# Patient Record
Sex: FEMALE | URBAN - METROPOLITAN AREA
[De-identification: names, ages, dates, MRNs, and addresses within clinical notes are randomized per-mention and may not be internally consistent; named-entity substitution may affect disease eponyms.]

---

## 2017-01-18 ENCOUNTER — MEDICAL CORRESPONDENCE (OUTPATIENT)
Dept: HEALTH INFORMATION MANAGEMENT | Facility: CLINIC | Age: 42
End: 2017-01-18

## 2017-01-18 ENCOUNTER — TRANSFERRED RECORDS (OUTPATIENT)
Dept: HEALTH INFORMATION MANAGEMENT | Facility: CLINIC | Age: 42
End: 2017-01-18

## 2017-01-24 ENCOUNTER — PRE VISIT (OUTPATIENT)
Dept: ORTHOPEDICS | Facility: CLINIC | Age: 42
End: 2017-01-24

## 2017-01-24 NOTE — TELEPHONE ENCOUNTER
1.  Date/reason for appt: 2/16/17- Left Hip Labral Tear     2.  Referring provider: Dr. Abundio Mclaughlin    3.  Call to patient (Yes / No - short description): No, referred by [facility] - faxed cover sheet

## 2017-01-25 NOTE — TELEPHONE ENCOUNTER
Records received from [facility], will forward to clinic. Waiting for imaging.  MRI left hip on 11/30/16    Missing: Injection at [facility]

## 2017-01-26 NOTE — TELEPHONE ENCOUNTER
MRI L Hip 11/30/16 radiology report received from , will forward to clinic and notify Sejal to cecilia.

## 2017-01-26 NOTE — TELEPHONE ENCOUNTER
Additional records received from [facility], will forward to clinic.  Xray left pelvis on 5/27/16    Missing injection.

## 2017-01-27 NOTE — TELEPHONE ENCOUNTER
Tried calling  Medical Records - no answer. Was on hold for over 7 minutes.     Faxed cover sheet for injection reports.

## 2017-02-02 ENCOUNTER — MEDICAL CORRESPONDENCE (OUTPATIENT)
Dept: HEALTH INFORMATION MANAGEMENT | Facility: CLINIC | Age: 42
End: 2017-02-02

## 2017-02-06 ASSESSMENT — ENCOUNTER SYMPTOMS
MYALGIAS: 1
JOINT SWELLING: 0
MUSCLE WEAKNESS: 0
MUSCLE CRAMPS: 1
ARTHRALGIAS: 1
BACK PAIN: 0
NECK PAIN: 0
STIFFNESS: 1

## 2017-02-06 ASSESSMENT — ACTIVITIES OF DAILY LIVING (ADL)
ADL_MEAN: 1.35
ADL_SUBSCALE_SCORE: 66.17
ADL_SUM: 23

## 2017-02-06 ASSESSMENT — HOOS S4: HOW SEVERE IS YOUR HIP JOINT STIFFNESS AFTER FIRST WAKENING IN THE MORNING?: MILD

## 2017-02-15 DIAGNOSIS — M24.152 DEGENERATIVE TEAR OF ACETABULAR LABRUM OF LEFT HIP: Primary | ICD-10-CM

## 2017-02-16 ENCOUNTER — OFFICE VISIT (OUTPATIENT)
Dept: ORTHOPEDICS | Facility: CLINIC | Age: 42
End: 2017-02-16

## 2017-02-16 VITALS — HEIGHT: 72 IN | WEIGHT: 173.8 LBS | BODY MASS INDEX: 23.54 KG/M2

## 2017-02-16 DIAGNOSIS — S73.192A TEAR OF ACETABULAR LABRUM, LEFT, INITIAL ENCOUNTER: Primary | ICD-10-CM

## 2017-02-16 PROBLEM — N94.9 DISORDER OF PELVIS: Status: ACTIVE | Noted: 2017-01-20

## 2017-02-16 PROBLEM — S73.199A TEAR OF ACETABULAR LABRUM: Status: ACTIVE | Noted: 2017-01-20

## 2017-02-16 PROBLEM — M25.859 FEMOROACETABULAR IMPINGEMENT: Status: ACTIVE | Noted: 2017-01-20

## 2017-02-16 RX ORDER — DROSPIRENONE AND ETHINYL ESTRADIOL 0.03MG-3MG
KIT ORAL
COMMUNITY
End: 2018-05-10

## 2017-02-16 NOTE — NURSING NOTE
"Reason For Visit:   Chief Complaint   Patient presents with     Consult     Pt. states that she is here today for Left Hip Pain. 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 She had done Physical Therapy in the past and it made it worst. Her last Cortisone Injection was on December 2016, affected for 5 days. Referring:  HEATHER RAVI        Pain Assessment  Patient Currently in Pain: Yes  0-10 Pain Scale: 2  Primary Pain Location: Hip  Pain Orientation: Left  Pain Descriptors: Discomfort, Sharp, Shooting, Sore, Aching, Dull  Alleviating Factors: Rest, NSAIDS  Aggravating Factors: Movement, Sitting, Standing, Walking               HEIGHT: 6' .25\", WEIGHT: 173 lbs 12.8 oz, BMI: Body mass index is 23.41 kg/(m^2).      Current Outpatient Prescriptions   Medication Sig Dispense Refill     drospirenone-ethinyl estradiol (CORY) 3-0.03 MG per tablet        FLUTICASONE PROPIONATE, NASAL, NA        Multiple Vitamin (MULTI VITAMIN PO)        Ibuprofen (ADVIL PO)             Allergies   Allergen Reactions     Ibuprofen      Other reaction(s): Nausea  In large doses     Other  [No Clinical Screening - See Comments]      Other reaction(s): Other, see comments  Seasonal- runny nose, sneezing, sinus congestion         "

## 2017-02-16 NOTE — LETTER
2/16/2017       RE: Myrtle Aguilera  [address] [address]     Dear Colleague,    Thank you for referring your patient, Myrtle Aguilera, to the [facility] at [facility]. Please see a copy of my visit note below.    Chief Complaint: Consult (Pt. states that she is here today for Left Hip Pain. 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 She had done Physical Therapy in the past and it made it worst. Her last Cortisone Injection was on December 2016, affected for 5 days. Referring:  ABUNDIO RAVI )      Physician:  Abundio Ravi    HPI: Myrtle Aguilera is a 41 year old female who presents today for evaluation of her left hip     Symptom Profile  Location of symptoms:  c-sign  Onset: insidious, associates with Cecilio Vicente yoga  Duration of symptoms: one year   Quality of symptoms: sharp, shooting, aching  Severity: severe   Alleviate: activity modification   Exacerbating: sitting, activities, running Cecilio Revelenso  Previous Treatments: Previous treatments include activity modification, oral pain medication,     Intra-articular steroind injection- this gave her good relief but only for a few weeks  Physical therapy     Current Status:  Results of the patient s Hip Disability and Osteoarthritis Outcome Score (HOOS)  are as follows (0-100 scales with 100 being the theoretical best):  Pain: 55  Symptoms:60  ADLs:66.17  Sports/Recreation: 25  Quality of Life:25  (http://koos.nu/)  [facility] Activity Score:    MEDICAL HISTORY: No past medical history on file.  Reviewed negative     Pertinent negatives:  Patient has no history of DVT or PE. Discussed risk factors.    Medications:     Current Outpatient Prescriptions:      drospirenone-ethinyl estradiol (CORY) 3-0.03 MG per tablet, , Disp: , Rfl:      FLUTICASONE PROPIONATE, NASAL, NA, , Disp: , Rfl:      Multiple Vitamin (MULTI VITAMIN PO), , Disp:  ", Rfl:      Ibuprofen (ADVIL PO), , Disp: , Rfl:     Allergies: Ibuprofen and Other  [no clinical screening - see comments]    SURGICAL HISTORY: No past surgical history on file.  Appy, nasal septum, toes dx    FAMILY HISTORY: No family history on file.   FA CVA, CA  HTN, DM    SOCIAL HISTORY:   Social History   Substance Use Topics     Smoking status: Never Smoker     Smokeless tobacco: Not on file     Alcohol use No   Single, lives alone  Working,     REVIEW OF SYSTEMS:  The comprehensive review of systems from the intake form was reviewed with the patient.  No fever, weight change or fatigue. No dry eyes. No oral ulcers, sore throat or voice change. No palpitations, syncope, angina or edema.  No chest pain, excessive sleepiness, shortness of breath or hemoptysis.   No abdominal pain, nausea, vomiting, diarrhea or heartburn.  No skin rash. No focal weakness or numbness. No bleeding or lymphadenopathy. No rhinitis or hives.     Exam:  On physical examination the patient appears the stated age, is in no acute distress, affectThe is appropriate, and breathing is non-labored.  Vitals are documented in the EMR and have been reviewed:    Ht 1.835 m (6' 0.25\")  Wt 78.8 kg (173 lb 12.8 oz)  BMI 23.41 kg/m2  6' .25\"  Body mass index is 23.41 kg/(m^2).    Rises from chair: easily   Gait: normal   Trendelenburg test: normal   Gains the exam table: easily     RIGHT hip subjective: not irritated  Abd: 40  Add:25  PFC:  Flexion: 110  IRF: 20  ERF: 45   Impingement test: + groin mild not usual    LEFT hip subjective: not irritated  Abd: 30  Add:  PFC:  Flexion: 100  IRF: 10  ERF: 40  Impingement test: ++ groin     Distally, the circulatory, motor, and sensation exam is intact with 5/5 EHL, gastroc-soleus, and tibialis anterior.  Sensation to light touch is intact.  Dorsalis pedis and posterior tibialis pulses are palpable.  There are no sores on the feet, no bruising, and no lymphedema.    X-rays:   LCE is 24 " degrees  Tonnis angle is 13 degrees  Question lateral acetabular cyst    Assessment: This is a 41 year old with left hip pain, a positive physical examination for a hip problem, and an intra-articular hip injection that at least temporarily relieved her hip symptoms. Her MR is a non-Atilio examination. Her surgical decision making is a difficult with the best of information. I have recommended that if she wants to further investigate a hip surgery that this would be the next step.     Plan:  Left hip MR arthrogram. She is going to RTC to review these images.         Again, thank you for allowing me to participate in the care of your patient.      Sincerely,    Frank Lainez MD

## 2017-02-16 NOTE — PROGRESS NOTES
Chief Complaint: Consult (Pt. states that she is here today for Left Hip Pain. 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 She had done Physical Therapy in the past and it made it worst. Her last Cortisone Injection was on December 2016, affected for 5 days. Referring:  ABUNDIO RAVI )      Physician:  Abundio Ravi    HPI: Myrtle Aguilera is a 41 year old female who presents today for evaluation of her left hip     Symptom Profile  Location of symptoms:  c-sign  Onset: insidious, associates with Tae  Kwondo, yoga  Duration of symptoms: one year   Quality of symptoms: sharp, shooting, aching  Severity: severe   Alleviate: activity modification   Exacerbating: sitting, activities, running [employer]  Previous Treatments: Previous treatments include activity modification, oral pain medication,     Intra-articular steroind injection- this gave her good relief but only for a few weeks  Physical therapy     Current Status:  Results of the patient s Hip Disability and Osteoarthritis Outcome Score (HOOS)  are as follows (0-100 scales with 100 being the theoretical best):  Pain: 55  Symptoms:60  ADLs:66.17  Sports/Recreation: 25  Quality of Life:25  (http://koos.nu/)  [facility] Activity Score:    MEDICAL HISTORY: No past medical history on file.  Reviewed negative     Pertinent negatives:  Patient has no history of DVT or PE. Discussed risk factors.    Medications:     Current Outpatient Prescriptions:      drospirenone-ethinyl estradiol (CORY) 3-0.03 MG per tablet, , Disp: , Rfl:      FLUTICASONE PROPIONATE, NASAL, NA, , Disp: , Rfl:      Multiple Vitamin (MULTI VITAMIN PO), , Disp: , Rfl:      Ibuprofen (ADVIL PO), , Disp: , Rfl:     Allergies: Ibuprofen and Other  [no clinical screening - see comments]    SURGICAL HISTORY: No past surgical history on file.  Appy, nasal septum, toes dx    FAMILY HISTORY: No family history on file.   FA CVA, CA  HTN, DM    SOCIAL  "HISTORY:   Social History   Substance Use Topics     Smoking status: Never Smoker     Smokeless tobacco: Not on file     Alcohol use No   Single, lives alone  Working,     REVIEW OF SYSTEMS:  The comprehensive review of systems from the intake form was reviewed with the patient.  No fever, weight change or fatigue. No dry eyes. No oral ulcers, sore throat or voice change. No palpitations, syncope, angina or edema.  No chest pain, excessive sleepiness, shortness of breath or hemoptysis.   No abdominal pain, nausea, vomiting, diarrhea or heartburn.  No skin rash. No focal weakness or numbness. No bleeding or lymphadenopathy. No rhinitis or hives.     Exam:  On physical examination the patient appears the stated age, is in no acute distress, affectThe is appropriate, and breathing is non-labored.  Vitals are documented in the EMR and have been reviewed:    Ht 1.835 m (6' 0.25\")  Wt 78.8 kg (173 lb 12.8 oz)  BMI 23.41 kg/m2  6' .25\"  Body mass index is 23.41 kg/(m^2).    Rises from chair: easily   Gait: normal   Trendelenburg test: normal   Gains the exam table: easily     RIGHT hip subjective: not irritated  Abd: 40  Add:25  PFC:  Flexion: 110  IRF: 20  ERF: 45   Impingement test: + groin mild not usual    LEFT hip subjective: not irritated  Abd: 30  Add:  PFC:  Flexion: 100  IRF: 10  ERF: 40  Impingement test: ++ groin     Distally, the circulatory, motor, and sensation exam is intact with 5/5 EHL, gastroc-soleus, and tibialis anterior.  Sensation to light touch is intact.  Dorsalis pedis and posterior tibialis pulses are palpable.  There are no sores on the feet, no bruising, and no lymphedema.    X-rays:   LCE is 24 degrees  Tonnis angle is 13 degrees  Question lateral acetabular cyst    Assessment: This is a 41 year old with left hip pain, a positive physical examination for a hip problem, and an intra-articular hip injection that at least temporarily relieved her hip symptoms. Her MR is a non-Atilio " examination. Her surgical decision making is a difficult with the best of information. I have recommended that if she wants to further investigate a hip surgery that this would be the next step.     Plan:  Left hip MR arthrogram. She is going to RTC to review these images.     Answers for HPI/ROS submitted by the patient on 2/6/2017   General Symptoms: No  Skin Symptoms: No  HENT Symptoms: No  EYE SYMPTOMS: No  HEART SYMPTOMS: No  LUNG SYMPTOMS: No  INTESTINAL SYMPTOMS: No  URINARY SYMPTOMS: No  GYNECOLOGIC SYMPTOMS: No  BREAST SYMPTOMS: No  SKELETAL SYMPTOMS: Yes  BLOOD SYMPTOMS: No  NERVOUS SYSTEM SYMPTOMS: No  MENTAL HEALTH SYMPTOMS: No  Back pain: No  Muscle aches: Yes  Neck pain: No  Swollen joints: No  Joint pain: Yes  Bone pain: Yes  Muscle cramps: Yes  Muscle weakness: No  Joint stiffness: Yes  Bone fracture: No

## 2017-02-16 NOTE — MR AVS SNAPSHOT
After Visit Summary   2/16/2017    Myrtle Aguilera    MRN: [id number]           Patient Information     Date Of Birth          1975        Visit Information        Provider Department      2/16/2017 5:30 PM Frank Lainez MD [facility]        Today's Diagnoses     Tear of acetabular labrum, left, initial encounter    -  1       Follow-ups after your visit        Future tests that were ordered for you today     Open Future Orders        Priority Expected Expires Ordered    XR Hip Gadolinium Injection Routine 2/16/2017 2/16/2018 2/16/2017    MR Hip Left w Contrast Routine  2/16/2018 2/16/2017            Who to contact     Please call your clinic at [phone] to:    Ask questions about your health    Make or cancel appointments    Discuss your medicines    Learn about your test results    Speak to your doctor   If you have compliments or concerns about an experience at your clinic, or if you wish to file a complaint, please contact [facility] Patient Relations at [phone] or email us at [email or web address]         Additional Information About Your Visit        MyChart Information     [employer] gives you secure access to your electronic health record. If you see a primary care provider, you can also send messages to your care team and make appointments. If you have questions, please call your primary care clinic.  If you do not have a primary care provider, please call [phone] and they will assist you.      [employer] is an electronic gateway that provides easy, online access to your medical records. With [employer], you can request a clinic appointment, read your test results, renew a prescription or communicate with your care team.     To access your existing account, please contact your [facility] or call [phone] for assistance.        Care EveryWhere ID     This is your Care EveryWhere ID. This  "could be used by other organizations to access your [place] medical records  [id number]        Your Vitals Were     Height BMI (Body Mass Index)                1.835 m (6' 0.25\") 23.41 kg/m2           Blood Pressure from Last 3 Encounters:   No data found for BP    Weight from Last 3 Encounters:   02/16/17 78.8 kg (173 lb 12.8 oz)               Primary Care Provider    None Specified       No primary provider on file.        Thank you!     Thank you for choosing [facility]  for your care. Our goal is always to provide you with excellent care. Hearing back from our patients is one way we can continue to improve our services. Please take a few minutes to complete the written survey that you may receive in the mail after your visit with us. Thank you!             Your Updated Medication List - Protect others around you: Learn how to safely use, store and throw away your medicines at www.disposemymeds.org.          This list is accurate as of: 2/16/17  6:37 PM.  Always use your most recent med list.                   Brand Name Dispense Instructions for use    ADVIL PO          drospirenone-ethinyl estradiol 3-0.03 MG per tablet    CORY         FLUTICASONE PROPIONATE (NASAL) NA          MULTI VITAMIN PO            "

## 2017-03-05 ASSESSMENT — ENCOUNTER SYMPTOMS
BACK PAIN: 1
ARTHRALGIAS: 1
MUSCLE WEAKNESS: 0
MUSCLE CRAMPS: 0
MYALGIAS: 0
NECK PAIN: 0
JOINT SWELLING: 0
STIFFNESS: 1

## 2017-03-05 ASSESSMENT — ACTIVITIES OF DAILY LIVING (ADL)
ADL_SUBSCALE_SCORE: 67.64
ADL_SUM: 22
ADL_MEAN: 1.29

## 2017-03-05 ASSESSMENT — HOOS S4: HOW SEVERE IS YOUR HIP JOINT STIFFNESS AFTER FIRST WAKENING IN THE MORNING?: MODERATE

## 2017-03-09 ENCOUNTER — OFFICE VISIT (OUTPATIENT)
Dept: ORTHOPEDICS | Facility: CLINIC | Age: 42
End: 2017-03-09

## 2017-03-09 VITALS — HEIGHT: 72 IN | WEIGHT: 172.7 LBS | BODY MASS INDEX: 23.39 KG/M2

## 2017-03-09 DIAGNOSIS — M25.552 HIP PAIN, LEFT: Primary | ICD-10-CM

## 2017-03-09 NOTE — NURSING NOTE
a. Does the patient take five or more prescription medications? No  b. Does patient have difficulty walking up two flights of stairs? No  c. Is patient s BMI 35 or greater? No  d. Is patient an insulin dependent diabetic? No  e. Does patient have a history of having a heart attack or a heart surgery of any kind? No  f. Does patient have a history of heart failure? No  g. Does the patient have a history of any type of transplant? No  h. Does the patient use inhalers on a daily basis? No  i. Does the patient have a history of pulmonary hypertension? No  Once the patient is evaluated by PAC contact (ex: Surgery schedulers name and number, RN's name and number, etc..);  Brisa Estrada,   Name of planned surgery_Left hip arthroscopy and labral repair.

## 2017-03-09 NOTE — MR AVS SNAPSHOT
"              After Visit Summary   3/9/2017    Myrtle Aguilera    MRN: [id number]           Patient Information     Date Of Birth          1975        Visit Information        Provider Department      3/9/2017 3:30 PM Frank Lainez MD [facility]        Today's Diagnoses     Hip pain, left    -  1       Follow-ups after your visit        Who to contact     Please call your clinic at [phone] to:    Ask questions about your health    Make or cancel appointments    Discuss your medicines    Learn about your test results    Speak to your doctor   If you have compliments or concerns about an experience at your clinic, or if you wish to file a complaint, please contact [facility] Patient Relations at [phone] or email us at [email or web address]         Additional Information About Your Visit        MyChart Information     [employer] gives you secure access to your electronic health record. If you see a primary care provider, you can also send messages to your care team and make appointments. If you have questions, please call your primary care clinic.  If you do not have a primary care provider, please call [phone] and they will assist you.      [employer] is an electronic gateway that provides easy, online access to your medical records. With [employer], you can request a clinic appointment, read your test results, renew a prescription or communicate with your care team.     To access your existing account, please contact your [facility] or call [phone] for assistance.        Care EveryWhere ID     This is your Care EveryWhere ID. This could be used by other organizations to access your [place] medical records  [id number]        Your Vitals Were     Height BMI (Body Mass Index)                1.835 m (6' 0.25\") 23.26 kg/m2           Blood Pressure from Last 3 Encounters:   No data found for BP    Weight from Last " 3 Encounters:   03/09/17 78.3 kg (172 lb 11.2 oz)   02/16/17 78.8 kg (173 lb 12.8 oz)              We Performed the Following     Nannette-Operative Worksheet        Primary Care Provider    None Specified       No primary provider on file.        Thank you!     Thank you for choosing [facility]  for your care. Our goal is always to provide you with excellent care. Hearing back from our patients is one way we can continue to improve our services. Please take a few minutes to complete the written survey that you may receive in the mail after your visit with us. Thank you!             Your Updated Medication List - Protect others around you: Learn how to safely use, store and throw away your medicines at www.disposemymeds.org.          This list is accurate as of: 3/9/17  4:15 PM.  Always use your most recent med list.                   Brand Name Dispense Instructions for use    ADVIL PO          drospirenone-ethinyl estradiol 3-0.03 MG per tablet    CORY         FLUTICASONE PROPIONATE (NASAL) NA          MULTI VITAMIN PO

## 2017-03-09 NOTE — NURSING NOTE
"Reason For Visit:   Chief Complaint   Patient presents with     RECHECK     Pt. states that she is here today for Left Hip MRI Result that was done on 03/03/2017.       Pain Assessment  Patient Currently in Pain: Yes  0-10 Pain Scale: 3  Primary Pain Location: Hip  Pain Orientation: Left  Pain Descriptors: Discomfort, Aching, Shooting, Sore  Alleviating Factors: Rest, NSAIDS  Aggravating Factors: Movement, Standing, Walking               HEIGHT: 6' .25\", WEIGHT: 172 lbs 11.2 oz, BMI: Body mass index is 23.26 kg/(m^2).      Current Outpatient Prescriptions   Medication Sig Dispense Refill     drospirenone-ethinyl estradiol (CORY) 3-0.03 MG per tablet        FLUTICASONE PROPIONATE, NASAL, NA        Multiple Vitamin (MULTI VITAMIN PO)        Ibuprofen (ADVIL PO)             Allergies   Allergen Reactions     Ibuprofen      Other reaction(s): Nausea  In large doses     Other  [No Clinical Screening - See Comments]      Other reaction(s): Other, see comments  Seasonal- runny nose, sneezing, sinus congestion       "

## 2017-03-09 NOTE — LETTER
3/9/2017       RE: Myrtle Aguilera  [address] [address]     Dear Colleague,    Thank you for referring your patient, Myrtle Aguilera, to the [facility] at [facility]. Please see a copy of my visit note below.    Myrtle returns today to review her progress. She reports that she continues to have severe pain in her hip. She is interested in learning about the operative options.     We reviewed the MR together.   Study Result   MR arthrogram of the left hip     Techniques: Multiplanar multisequence imaging of the left hip was  obtained after administration of intra-articular contrast using  routing protocol.     History: Left Hip Labral tear, Other sprain of left hip, initial  encounter     Comparison: 2/16/2017, November 30, 2016 MR     Findings:     Osseous structures  Osseous structures: No fracture, stress reaction, avascular necrosis,  or focal osseous lesion is seen. Cystic changes of the anterior and  lateral aspect of the femoral head neck junction, consistent with  synovial herniation pits. There is associated bony fullness of the  femoral neck laterally.     Articular cartilage and labrum     Articular cartilage: Subchondral cystic change in the superior  acetabulum with overlying full-thickness chondral fissure.     Labrum: Anterior superior labral tearing involving approximately 2:00  with 3:00 being anterior on this convention with associated tiny  paralabral cysts and subchondral cystic change. Additional labral  irregularity more superiorly to approximately 12:00.     Ligament teres and transverse ligament of acetabulum: Intact.     Joint or bursal effusion     Joint effusion: Excellent distention of joint with intra-articularly  injected gadolinium contrast.     Bursal effusion: Minimal nonspecific edema over the greater  trochanter. No substantial iliopsoas or trochanteric bursal effusion.     Muscles and tendons  Muscles and  tendons: The rectus femoris, iliopsoas, proximal  hamstrings, and hip abductors are intact. The visualized adductor  muscles are unremarkable.      Nerves:  The visualized course of the sciatic nerve is unremarkable.     Other Findings:  Anterior soft tissue edema signal in keeping with access related  change.         Impression:  1. Anterosuperior labral tearing approximately 12:00 to 2:00 with cam  type femoroacetabular impingement syndrome.  2. Subchondral cystic change in the superior acetabulum with overlying  full-thickness chondral fissure.     YAZMARIAA MANUELASHI       Assessment:   Labral tear, early cartilage changes, low-end normal socket size. Long terms symptoms that have not responded to comprehensive non-operative management including activity modification oral pain medication, intra-articular injection and grater than 6 months of PT. We spent approximately 15 minutes discussing the risks and benefits of hip arthroscopy and its associated procedures.  We reviewed the proposed surgical plan.  The discussion included but was not limited to the following:  Blood clots, blood clots to the lungs, injury to blood vessels and nerves, and post-operative stiffness.  We spent significant  time discussing the pre and post-operative rehabilitation protocols, the milestones for progressing through phases of the protocol, and the post-operative restrictions and their timelines.  All the patient's questions were answered to the best of my ability.  The patient would like to proceed.    Plan: left hip arthroscopy and labral repair    Again, thank you for allowing me to participate in the care of your patient.      Sincerely,    Frank Lainez MD

## 2017-03-09 NOTE — NURSING NOTE
Teaching Flowsheet   Relevant Diagnosis: Left degenerative tear of acetabular labrum  Teaching Topic: Left hip arthroscopy with labral repair     Person(s) involved in teaching:   Patient     Motivation Level:  Asks Questions: Yes  Eager to Learn: Yes  Cooperative: Yes  Receptive (willing/able to accept information): Yes  Any cultural factors/Mosque beliefs that may influence understanding or compliance? No     Patient demonstrates understanding of the following:  Reason for the appointment, diagnosis and treatment plan: Yes  Knowledge of proper use of medications and conditions for which they are ordered (with special attention to potential side effects or drug interactions): Yes  Which situations necessitate calling provider and whom to contact: Yes     Teaching Concerns Addressed:      Proper use and care of assistive devices. (medical equip, care aids, etc.): Yes  Nutritional needs and diet plan: NA  Pain management techniques: Yes  Wound Care: Yes  How and/when to access community resources: Yes     Instructional Materials Used/Given: Preoperative/postoperative teaching packet, surgical soap.     Patient's health history is positive only for taking birth control pills.     Will print out PT orders for after the procedure along with protocol and mail to patient for use at her PT.

## 2017-03-09 NOTE — PROGRESS NOTES
Myrtle returns today to review her progress. She reports that she continues to have severe pain in her hip. She is interested in learning about the operative options.     We reviewed the MR together.   Study Result   MR arthrogram of the left hip     Techniques: Multiplanar multisequence imaging of the left hip was  obtained after administration of intra-articular contrast using  routing protocol.     History: Left Hip Labral tear, Other sprain of left hip, initial  encounter     Comparison: 2/16/2017, November 30, 2016 MR     Findings:     Osseous structures  Osseous structures: No fracture, stress reaction, avascular necrosis,  or focal osseous lesion is seen. Cystic changes of the anterior and  lateral aspect of the femoral head neck junction, consistent with  synovial herniation pits. There is associated bony fullness of the  femoral neck laterally.     Articular cartilage and labrum     Articular cartilage: Subchondral cystic change in the superior  acetabulum with overlying full-thickness chondral fissure.     Labrum: Anterior superior labral tearing involving approximately 2:00  with 3:00 being anterior on this convention with associated tiny  paralabral cysts and subchondral cystic change. Additional labral  irregularity more superiorly to approximately 12:00.     Ligament teres and transverse ligament of acetabulum: Intact.     Joint or bursal effusion     Joint effusion: Excellent distention of joint with intra-articularly  injected gadolinium contrast.     Bursal effusion: Minimal nonspecific edema over the greater  trochanter. No substantial iliopsoas or trochanteric bursal effusion.     Muscles and tendons  Muscles and tendons: The rectus femoris, iliopsoas, proximal  hamstrings, and hip abductors are intact. The visualized adductor  muscles are unremarkable.      Nerves:  The visualized course of the sciatic nerve is unremarkable.     Other Findings:  Anterior soft tissue edema signal in keeping with  access related  change.         Impression:  1. Anterosuperior labral tearing approximately 12:00 to 2:00 with cam  type femoroacetabular impingement syndrome.  2. Subchondral cystic change in the superior acetabulum with overlying  full-thickness chondral fissure.     YAZ COELHO       Assessment:   Labral tear, early cartilage changes, low-end normal socket size. Long terms symptoms that have not responded to comprehensive non-operative management including activity modification oral pain medication, intra-articular injection and grater than 6 months of PT. We spent approximately 15 minutes discussing the risks and benefits of hip arthroscopy and its associated procedures.  We reviewed the proposed surgical plan.  The discussion included but was not limited to the following:  Blood clots, blood clots to the lungs, injury to blood vessels and nerves, and post-operative stiffness.  We spent significant  time discussing the pre and post-operative rehabilitation protocols, the milestones for progressing through phases of the protocol, and the post-operative restrictions and their timelines.  All the patient's questions were answered to the best of my ability.  The patient would like to proceed.    Plan: left hip arthroscopy and labral repair  Answers for HPI/ROS submitted by the patient on 3/5/2017   General Symptoms: No  Skin Symptoms: No  HENT Symptoms: No  EYE SYMPTOMS: No  HEART SYMPTOMS: No  LUNG SYMPTOMS: No  INTESTINAL SYMPTOMS: No  URINARY SYMPTOMS: No  GYNECOLOGIC SYMPTOMS: No  BREAST SYMPTOMS: No  SKELETAL SYMPTOMS: Yes  BLOOD SYMPTOMS: No  NERVOUS SYSTEM SYMPTOMS: No  MENTAL HEALTH SYMPTOMS: No  Back pain: Yes  Muscle aches: No  Neck pain: No  Swollen joints: No  Joint pain: Yes  Bone pain: No  Muscle cramps: No  Muscle weakness: No  Joint stiffness: Yes  Bone fracture: No

## 2017-03-13 ENCOUNTER — TELEPHONE (OUTPATIENT)
Dept: ORTHOPEDICS | Facility: CLINIC | Age: 42
End: 2017-03-13

## 2017-06-07 ENCOUNTER — ANESTHESIA EVENT (OUTPATIENT)
Dept: SURGERY | Facility: AMBULATORY SURGERY CENTER | Age: 42
End: 2017-06-07

## 2017-06-08 ENCOUNTER — HOSPITAL ENCOUNTER (OUTPATIENT)
Facility: AMBULATORY SURGERY CENTER | Age: 42
End: 2017-06-08
Attending: ORTHOPAEDIC SURGERY

## 2017-06-08 ENCOUNTER — SURGERY (OUTPATIENT)
Age: 42
End: 2017-06-08

## 2017-06-08 ENCOUNTER — ANESTHESIA (OUTPATIENT)
Dept: SURGERY | Facility: AMBULATORY SURGERY CENTER | Age: 42
End: 2017-06-08

## 2017-06-08 VITALS
SYSTOLIC BLOOD PRESSURE: 124 MMHG | HEART RATE: 78 BPM | DIASTOLIC BLOOD PRESSURE: 78 MMHG | TEMPERATURE: 97.5 F | BODY MASS INDEX: 23.43 KG/M2 | OXYGEN SATURATION: 100 % | WEIGHT: 173 LBS | RESPIRATION RATE: 16 BRPM | HEIGHT: 72 IN

## 2017-06-08 DIAGNOSIS — Z98.890 STATUS POST ARTHROSCOPY OF HIP: Primary | ICD-10-CM

## 2017-06-08 LAB
HCG UR QL: NEGATIVE
INTERNAL QC OK POCT: YES

## 2017-06-08 DEVICE — IMPLANTABLE DEVICE: Type: IMPLANTABLE DEVICE | Site: HIP | Status: FUNCTIONAL

## 2017-06-08 RX ORDER — MEPERIDINE HYDROCHLORIDE 25 MG/ML
12.5 INJECTION INTRAMUSCULAR; INTRAVENOUS; SUBCUTANEOUS
Status: DISCONTINUED | OUTPATIENT
Start: 2017-06-08 | End: 2017-06-09 | Stop reason: HOSPADM

## 2017-06-08 RX ORDER — OXYCODONE HYDROCHLORIDE 5 MG/1
5 TABLET ORAL ONCE
Status: COMPLETED | OUTPATIENT
Start: 2017-06-08 | End: 2017-06-08

## 2017-06-08 RX ORDER — FENTANYL CITRATE 50 UG/ML
25-50 INJECTION, SOLUTION INTRAMUSCULAR; INTRAVENOUS
Status: DISCONTINUED | OUTPATIENT
Start: 2017-06-08 | End: 2017-06-08 | Stop reason: HOSPADM

## 2017-06-08 RX ORDER — AMOXICILLIN 250 MG
1-2 CAPSULE ORAL 2 TIMES DAILY
Qty: 26 TABLET | Refills: 0 | Status: SHIPPED | OUTPATIENT
Start: 2017-06-08 | End: 2017-07-27

## 2017-06-08 RX ORDER — CEFAZOLIN SODIUM 1 G/3ML
1 INJECTION, POWDER, FOR SOLUTION INTRAMUSCULAR; INTRAVENOUS SEE ADMIN INSTRUCTIONS
Status: DISCONTINUED | OUTPATIENT
Start: 2017-06-08 | End: 2017-06-08 | Stop reason: HOSPADM

## 2017-06-08 RX ORDER — LIDOCAINE 40 MG/G
CREAM TOPICAL
Status: DISCONTINUED | OUTPATIENT
Start: 2017-06-08 | End: 2017-06-08 | Stop reason: HOSPADM

## 2017-06-08 RX ORDER — PROMETHAZINE HYDROCHLORIDE 25 MG/ML
12.5 INJECTION, SOLUTION INTRAMUSCULAR; INTRAVENOUS
Status: DISCONTINUED | OUTPATIENT
Start: 2017-06-08 | End: 2017-06-09 | Stop reason: HOSPADM

## 2017-06-08 RX ORDER — PROPOFOL 10 MG/ML
INJECTION, EMULSION INTRAVENOUS PRN
Status: DISCONTINUED | OUTPATIENT
Start: 2017-06-08 | End: 2017-06-08

## 2017-06-08 RX ORDER — LIDOCAINE HYDROCHLORIDE 20 MG/ML
INJECTION, SOLUTION INFILTRATION; PERINEURAL PRN
Status: DISCONTINUED | OUTPATIENT
Start: 2017-06-08 | End: 2017-06-08

## 2017-06-08 RX ORDER — SODIUM CHLORIDE, SODIUM LACTATE, POTASSIUM CHLORIDE, CALCIUM CHLORIDE 600; 310; 30; 20 MG/100ML; MG/100ML; MG/100ML; MG/100ML
INJECTION, SOLUTION INTRAVENOUS CONTINUOUS
Status: DISCONTINUED | OUTPATIENT
Start: 2017-06-08 | End: 2017-06-08 | Stop reason: HOSPADM

## 2017-06-08 RX ORDER — PROPOFOL 10 MG/ML
INJECTION, EMULSION INTRAVENOUS CONTINUOUS PRN
Status: DISCONTINUED | OUTPATIENT
Start: 2017-06-08 | End: 2017-06-08

## 2017-06-08 RX ORDER — GABAPENTIN 300 MG/1
300 CAPSULE ORAL ONCE
Status: COMPLETED | OUTPATIENT
Start: 2017-06-08 | End: 2017-06-08

## 2017-06-08 RX ORDER — DEXAMETHASONE SODIUM PHOSPHATE 4 MG/ML
INJECTION, SOLUTION INTRA-ARTICULAR; INTRALESIONAL; INTRAMUSCULAR; INTRAVENOUS; SOFT TISSUE PRN
Status: DISCONTINUED | OUTPATIENT
Start: 2017-06-08 | End: 2017-06-08

## 2017-06-08 RX ORDER — ACETAMINOPHEN 325 MG/1
975 TABLET ORAL ONCE
Status: COMPLETED | OUTPATIENT
Start: 2017-06-08 | End: 2017-06-08

## 2017-06-08 RX ORDER — SODIUM CHLORIDE, SODIUM LACTATE, POTASSIUM CHLORIDE, CALCIUM CHLORIDE 600; 310; 30; 20 MG/100ML; MG/100ML; MG/100ML; MG/100ML
500 INJECTION, SOLUTION INTRAVENOUS CONTINUOUS
Status: DISCONTINUED | OUTPATIENT
Start: 2017-06-08 | End: 2017-06-08 | Stop reason: HOSPADM

## 2017-06-08 RX ORDER — FENTANYL CITRATE 50 UG/ML
INJECTION, SOLUTION INTRAMUSCULAR; INTRAVENOUS PRN
Status: DISCONTINUED | OUTPATIENT
Start: 2017-06-08 | End: 2017-06-08

## 2017-06-08 RX ORDER — NALOXONE HYDROCHLORIDE 0.4 MG/ML
.1-.4 INJECTION, SOLUTION INTRAMUSCULAR; INTRAVENOUS; SUBCUTANEOUS
Status: DISCONTINUED | OUTPATIENT
Start: 2017-06-08 | End: 2017-06-09 | Stop reason: HOSPADM

## 2017-06-08 RX ORDER — EPHEDRINE SULFATE 50 MG/ML
INJECTION, SOLUTION INTRAMUSCULAR; INTRAVENOUS; SUBCUTANEOUS PRN
Status: DISCONTINUED | OUTPATIENT
Start: 2017-06-08 | End: 2017-06-08

## 2017-06-08 RX ORDER — OXYCODONE HYDROCHLORIDE 5 MG/1
5-10 TABLET ORAL EVERY 4 HOURS PRN
Qty: 40 TABLET | Refills: 0 | Status: SHIPPED | OUTPATIENT
Start: 2017-06-08 | End: 2017-07-27

## 2017-06-08 RX ORDER — ACETAMINOPHEN 325 MG/1
325-650 TABLET ORAL EVERY 4 HOURS PRN
Qty: 100 TABLET | Refills: 0 | Status: SHIPPED | OUTPATIENT
Start: 2017-06-08 | End: 2017-07-27

## 2017-06-08 RX ORDER — ONDANSETRON 2 MG/ML
INJECTION INTRAMUSCULAR; INTRAVENOUS PRN
Status: DISCONTINUED | OUTPATIENT
Start: 2017-06-08 | End: 2017-06-08

## 2017-06-08 RX ORDER — SODIUM CHLORIDE, SODIUM LACTATE, POTASSIUM CHLORIDE, CALCIUM CHLORIDE 600; 310; 30; 20 MG/100ML; MG/100ML; MG/100ML; MG/100ML
INJECTION, SOLUTION INTRAVENOUS CONTINUOUS
Status: DISCONTINUED | OUTPATIENT
Start: 2017-06-08 | End: 2017-06-09 | Stop reason: HOSPADM

## 2017-06-08 RX ORDER — ONDANSETRON 2 MG/ML
4 INJECTION INTRAMUSCULAR; INTRAVENOUS EVERY 30 MIN PRN
Status: DISCONTINUED | OUTPATIENT
Start: 2017-06-08 | End: 2017-06-09 | Stop reason: HOSPADM

## 2017-06-08 RX ORDER — ONDANSETRON 4 MG/1
4 TABLET, ORALLY DISINTEGRATING ORAL EVERY 30 MIN PRN
Status: DISCONTINUED | OUTPATIENT
Start: 2017-06-08 | End: 2017-06-09 | Stop reason: HOSPADM

## 2017-06-08 RX ADMIN — PROPOFOL 100 MG: 10 INJECTION, EMULSION INTRAVENOUS at 08:15

## 2017-06-08 RX ADMIN — LIDOCAINE HYDROCHLORIDE 80 MG: 20 INJECTION, SOLUTION INFILTRATION; PERINEURAL at 07:26

## 2017-06-08 RX ADMIN — FENTANYL CITRATE 50 MCG: 50 INJECTION, SOLUTION INTRAMUSCULAR; INTRAVENOUS at 07:26

## 2017-06-08 RX ADMIN — Medication 0.5 MG: at 08:19

## 2017-06-08 RX ADMIN — OXYCODONE HYDROCHLORIDE 5 MG: 5 TABLET ORAL at 10:17

## 2017-06-08 RX ADMIN — PROPOFOL 60 MG: 10 INJECTION, EMULSION INTRAVENOUS at 08:12

## 2017-06-08 RX ADMIN — PROPOFOL 150 MCG/KG/MIN: 10 INJECTION, EMULSION INTRAVENOUS at 07:29

## 2017-06-08 RX ADMIN — EPHEDRINE SULFATE 10 MG: 50 INJECTION, SOLUTION INTRAMUSCULAR; INTRAVENOUS; SUBCUTANEOUS at 07:56

## 2017-06-08 RX ADMIN — EPHEDRINE SULFATE 10 MG: 50 INJECTION, SOLUTION INTRAMUSCULAR; INTRAVENOUS; SUBCUTANEOUS at 07:43

## 2017-06-08 RX ADMIN — GABAPENTIN 300 MG: 300 CAPSULE ORAL at 06:54

## 2017-06-08 RX ADMIN — DEXAMETHASONE SODIUM PHOSPHATE 4 MG: 4 INJECTION, SOLUTION INTRA-ARTICULAR; INTRALESIONAL; INTRAMUSCULAR; INTRAVENOUS; SOFT TISSUE at 07:26

## 2017-06-08 RX ADMIN — SODIUM CHLORIDE, SODIUM LACTATE, POTASSIUM CHLORIDE, CALCIUM CHLORIDE: 600; 310; 30; 20 INJECTION, SOLUTION INTRAVENOUS at 06:53

## 2017-06-08 RX ADMIN — PROPOFOL: 10 INJECTION, EMULSION INTRAVENOUS at 08:48

## 2017-06-08 RX ADMIN — FENTANYL CITRATE 50 MCG: 50 INJECTION, SOLUTION INTRAMUSCULAR; INTRAVENOUS at 07:34

## 2017-06-08 RX ADMIN — ACETAMINOPHEN 975 MG: 325 TABLET ORAL at 06:53

## 2017-06-08 RX ADMIN — PROPOFOL: 10 INJECTION, EMULSION INTRAVENOUS at 08:14

## 2017-06-08 RX ADMIN — ONDANSETRON 4 MG: 2 INJECTION INTRAMUSCULAR; INTRAVENOUS at 09:37

## 2017-06-08 RX ADMIN — PROPOFOL 200 MG: 10 INJECTION, EMULSION INTRAVENOUS at 07:26

## 2017-06-08 NOTE — DISCHARGE INSTRUCTIONS
"[facility]  Home Care Following Anesthesia  For 24 hours after surgery:  1. Get plenty of rest.  A responsible adult must stay with you for at least 24 hours after you leave the surgery center.  2. Do not drive or use heavy equipment.  If you have weakness or tingling, don't drive or use heavy equipment until this feeling goes away.   3. Do not drink alcohol.   4. Avoid strenuous or risky activities.  Ask for help when climbing stairs.  5. You may feel lightheaded.  IF so, sit for a few minutes before standing.  Have someone help you get up.   6. If you have nausea (feel sick to your stomach): Drink only clear liquids such as apple juice, ginger ale, broth or 7-Up.  Rest may also help.  Be sure to drink enough fluids.  Move to a regular diet as you feel able.   7. You may have a slight fever.  Call the doctor if your fever is over 100 F (37.7 C) (taken under the tongue) or lasts longer than 24 hours.  8. You may have a dry mouth, a sore throat, muscle aches or trouble sleeping. These should go away after 24 hours.  9. Do not make important or legal decisions.        Today you received a Marcaine or bupivacaine block to numb the nerves near your surgery site.  This is a block using local anesthetic or \"numbing\" medication injected around the nerves to anesthetize or \"numb\" the area supplied by those nerves.  This block is injected into the muscle layer near your surgical site.  The medication may numb the location where you had surgery for 6-18 hours, but may last up to 24 hours.  If your surgical site is an arm or leg you should be careful with your affected limb, since it is possible to injure your limb without being aware of it due to the numbing.  Until full feeling returns, you should guard against bumping or hitting your limb, and avoid extreme hot or cold temperatures on the skin.  As the block wears off, the feeling will return as a tingling or prickly sensation near your " surgical site.  You will experience more discomfort from your incision as the feeling returns.  You may want to take a pain pill (a narcotic or Tylenol if this was prescribed by your surgeon) when you start to experience mild pain before the pain beccomes more severe.  If your pain medications do not control your pain you should notifiy your surgeon.    Tips for taking pain medications  To get the best pain relief possible, remember these points:    Take pain medications as directed, before pain becomes severe.    Pain medication can upset your stomach: taking it with food may help.    Constipation is a common side effect of pain medication. Drink plenty of  fluids.    Eat foods high in fiber. Take a stool softener if recommended by your doctor or pharmacist.    Do not drink alcohol, drive or operate machinery while taking pain medications.    Ask about other ways to control pain, such as with heat, ice or relaxation.    Call a doctor for any of the followin. Signs of infection (fever, growing tenderness at the surgery site, a large amount of drainage or bleeding, severe pain, foul-smelling drainage, redness, swelling).  2. It has been over 8 to 10 hours since surgery and you are still not able to urinate (pass water).  3. Headache for over 24 hours.  4. Numbness, tingling or weakness the day after surgery (if you had spinal anesthesia).  Your doctor is:       Dr. Frank Lainez, Orthopaedics: [phone]               Or dial [phone] and ask for the resident on call for:  Orthopaedics  For emergency care, call the:  [facility] [place] [facility] Department: [phone] (TTY for hearing impaired: [phone])

## 2017-06-08 NOTE — IP AVS SNAPSHOT
MRN:[id number]                      After Visit Summary   6/8/2017    Myrtle Aguilera    MRN: [id number]           Thank you!     Thank you for choosing [facility] for your care. Our goal is always to provide you with excellent care. Hearing back from our patients is one way we can continue to improve our services. Please take a few minutes to complete the written survey that you may receive in the mail after you visit with us. Thank you!        Patient Information     Date Of Birth          1975        About your hospital stay     You were admitted on:  June 8, 2017 [facility]    You were discharged on:  June 8, 2017       Who to Call     For medical emergencies, please call 911.  For non-urgent questions about your medical care, please call your primary care provider or clinic, None  For questions related to your surgery, please call your surgery clinic        Attending Provider     Provider Specialty    Frank Lainez MD Orthopedics       Primary Care Provider    None Specified      After Care Instructions     Discharge Instructions       HIP ARTHROSCOPY POST OPERATIVE INSTRUCTIONS    A.  COMFORT:  -Swelling: An ice pack will be provided to control swelling and discomfort. You may apply the ice pack to the surgical site for no more than 20 minutes at a time. Allow at least 20 minutes of respite in between icing sessions  -Pain Medication: Take medication as prescribed, but only as often as necessary.  Avoid alcohol and driving if you are taking pain medication.  Remember that Tylenol can be used for pain relief as well, as you wean off the narcotics. Maximum Tylenol dose for a single day is 3000 mg.            B.  ACTIVITIES:  -Anti-rotation boots: Wear anti-rotation boots and barrel pillow while you sleep and during tummy time to prevent leg abduction  -Tummy time: Spend a total of 2 hours each day in the prone position with the  anti-rotation boots on. You may choose to do the 2 hours all at once or at shorter intervals throughout the day  -Hip brace: You will be measured and fitted for a hip brace before surgery. The brace is to be worn anytime you are standing or ambulating after surgery.  -Crutches/Walker: You will be provided with crutches or a walker at time of discharge. Crutches are to be used for one week following your procedure. Use these assistive devices to ensure that no more than 50 pounds are applied to the operative extremity.     -Exercises: Point and flex your feet and wiggle your toes, move your ankle around in a [place], to help prevent complications such as blood clotting in your legs. These exercises should be done for 10 to 15 minutes, 3 times a day, for the first few weeks after surgery.    -Weight bearing status: You may apply no more than 50 pounds to the operative extremity. Crutches or a walker will be provided to you at time of discharge.  -Physical therapy: A prescription for physical therapy will be sent home with you and must be taken to your first therapy visit  -Driving: Driving is NOT permitted and should be avoided until allowed by your provider.  -Athletic activities: Athletic activities, such as swimming, bicycling, jogging, running and stop-and-go-sports should be avoided until allowed by your provider.  -Return to work: As advised by your provider.      C. INCISION CARE:    -Keep the initial post-op dressing on, clean and dry for the first 3 days after surgery. You may then remove the dressing and shower. Do not immerse wound in water until sutures removed. Do not soak wound in water. Dry incision by patting with a clean, freshly laundered towel. Redress surgical site with a clean, dry dressing.  -The incision was closed with black nylon suture. These suture will be removed by the nurse at the 2 week post-operative wound check.   -Tub bathing, swimming, and soaking of the wound should be avoided until  allowed by your provider - usually 4 weeks after your surgery.    D. CALL YOUR PHYSICIAN IF:  -Pain in your hip persists or worsens in the first few days after surgery.  -Excessive redness or drainage of cloudy or bloody material from the wounds (Clear red tinted fluid and some mild drainage should be expected). Drainage of any kind 5 days after surgery should be reported to the doctor.  -You have a temperature elevation greater than 101.5    -You have pain, swelling or redness in your calf.  -You have numbness or weakness in your leg or foot.      You may contact your physician at [phone] during business hours.    For after hours or weekend calls, you may contact the hospital at [phone] and ask for the on-call orthopedic resident            Return to clinic       Return to clinic in 2 weeks for a nursing wound check            KAYLAH Compression Stockings       Wear KAYLAH compression stockings for two weeks until up and around            Tummy time       You should lie on stomach for a total of two hours per day.            Weight bearing - 50 pound weight bearing status to operative extremity       Do not apply more than 50 pounds to the operative extremity                  Your next 10 appointments already scheduled     Jun 21, 2017 10:00 AM CDT   (Arrive by 9:45 AM)   Post-Op with Delroy DUGAN Ortho Nurse   [facility] ([facility])    [address] [place] [id number] [phone]            Jul 27, 2017  2:00 PM CDT   (Arrive by 1:45 PM)   Post-Op with Frank Lainez MD   [facility])    [address] [place] [id number] [phone]              Additional Services     [facility], [facility]RACTIC REFERRAL                 Further instructions from your care team       [facility]  Home Care Following Anesthesia  For 24  "hours after surgery:  1. Get plenty of rest.  A responsible adult must stay with you for at least 24 hours after you leave the surgery center.  2. Do not drive or use heavy equipment.  If you have weakness or tingling, don't drive or use heavy equipment until this feeling goes away.   3. Do not drink alcohol.   4. Avoid strenuous or risky activities.  Ask for help when climbing stairs.  5. You may feel lightheaded.  IF so, sit for a few minutes before standing.  Have someone help you get up.   6. If you have nausea (feel sick to your stomach): Drink only clear liquids such as apple juice, ginger ale, broth or 7-Up.  Rest may also help.  Be sure to drink enough fluids.  Move to a regular diet as you feel able.   7. You may have a slight fever.  Call the doctor if your fever is over 100 F (37.7 C) (taken under the tongue) or lasts longer than 24 hours.  8. You may have a dry mouth, a sore throat, muscle aches or trouble sleeping. These should go away after 24 hours.  9. Do not make important or legal decisions.        Today you received a Marcaine or bupivacaine block to numb the nerves near your surgery site.  This is a block using local anesthetic or \"numbing\" medication injected around the nerves to anesthetize or \"numb\" the area supplied by those nerves.  This block is injected into the muscle layer near your surgical site.  The medication may numb the location where you had surgery for 6-18 hours, but may last up to 24 hours.  If your surgical site is an arm or leg you should be careful with your affected limb, since it is possible to injure your limb without being aware of it due to the numbing.  Until full feeling returns, you should guard against bumping or hitting your limb, and avoid extreme hot or cold temperatures on the skin.  As the block wears off, the feeling will return as a tingling or prickly sensation near your surgical site.  You will experience more discomfort from your incision as the feeling " returns.  You may want to take a pain pill (a narcotic or Tylenol if this was prescribed by your surgeon) when you start to experience mild pain before the pain beccomes more severe.  If your pain medications do not control your pain you should notifiy your surgeon.    Tips for taking pain medications  To get the best pain relief possible, remember these points:    Take pain medications as directed, before pain becomes severe.    Pain medication can upset your stomach: taking it with food may help.    Constipation is a common side effect of pain medication. Drink plenty of  fluids.    Eat foods high in fiber. Take a stool softener if recommended by your doctor or pharmacist.    Do not drink alcohol, drive or operate machinery while taking pain medications.    Ask about other ways to control pain, such as with heat, ice or relaxation.    Call a doctor for any of the followin. Signs of infection (fever, growing tenderness at the surgery site, a large amount of drainage or bleeding, severe pain, foul-smelling drainage, redness, swelling).  2. It has been over 8 to 10 hours since surgery and you are still not able to urinate (pass water).  3. Headache for over 24 hours.  4. Numbness, tingling or weakness the day after surgery (if you had spinal anesthesia).  Your doctor is:       Dr. Frank Lainez, Orthopaedics: [phone]               Or dial [phone] and ask for the resident on call for:  Orthopaedics  For emergency care, call the:  [facility]: [phone] (TTY for hearing impaired: [phone])                  Pending Results     No orders found from 2017 to 2017.            Admission Information     Date & Time Provider Department Dept. Phone    2017 Frank Lainez MD [facility] [phone]      Your Vitals Were     Blood Pressure Pulse Temperature Respirations Height Weight    117/65 78 97.2  F (36.2  C) (Temporal) 16 1.829 m (6') 78.5  kg (173 lb)    Last Period Pulse Oximetry BMI (Body Mass Index)             05/22/2017 99% 23.46 kg/m2         Everwise Information     Everwise gives you secure access to your electronic health record. If you see a primary care provider, you can also send messages to your care team and make appointments. If you have questions, please call your primary care clinic.  If you do not have a primary care provider, please call [phone] and they will assist you.      Everwise is an electronic gateway that provides easy, online access to your medical records. With Everwise, you can request a clinic appointment, read your test results, renew a prescription or communicate with your care team.     To access your existing account, please contact your [facility] or call [phone] for assistance.        Care EveryWhere ID     This is your Care EveryWhere ID. This could be used by other organizations to access your [facility] medical records  [id number]           Review of your medicines      START taking        Dose / Directions    acetaminophen 325 MG tablet   Commonly known as:  TYLENOL   Used for:  Status post arthroscopy of hip        Dose:  325-650 mg   Take 1-2 tablets (325-650 mg) by mouth every 4 hours as needed for other (mild pain)   Quantity:  100 tablet   Refills:  0       aspirin  MG EC tablet   Used for:  Status post arthroscopy of hip        Dose:  325 mg   Take 1 tablet (325 mg) by mouth daily   Quantity:  28 tablet   Refills:  0       oxyCODONE 5 MG IR tablet   Commonly known as:  ROXICODONE   Used for:  Status post arthroscopy of hip        Dose:  5-10 mg   Take 1-2 tablets (5-10 mg) by mouth every 4 hours as needed for pain or other (Moderate to Severe)   Quantity:  40 tablet   Refills:  0       senna-docusate 8.6-50 MG per tablet   Commonly known as:  SENOKOT-S;PERICOLACE   Used for:  Status post arthroscopy of hip        Dose:  1-2 tablet   Take 1-2 tablets by mouth 2  times daily Take while on oral narcotics to prevent or treat constipation.   Quantity:  26 tablet   Refills:  0         CONTINUE these medicines which have NOT CHANGED        Dose / Directions    ADVIL PO        Refills:  0       drospirenone-ethinyl estradiol 3-0.03 MG per tablet   Commonly known as:  CORY        Refills:  0       FLUTICASONE PROPIONATE (NASAL) NA        Refills:  0       MULTI VITAMIN PO        Refills:  0            Where to get your medicines      These medications were sent to [place], [place] - [address] [facility] [address]  [address] [facility] [address], [facility] [address]    Hours:  TRANSPLANT PHONE NUMBER [phone] Phone:  [phone]     acetaminophen 325 MG tablet    aspirin  MG EC tablet    senna-docusate 8.6-50 MG per tablet         Some of these will need a paper prescription and others can be bought over the counter. Ask your nurse if you have questions.     Bring a paper prescription for each of these medications     oxyCODONE 5 MG IR tablet                Protect others around you: Learn how to safely use, store and throw away your medicines at www.disposemymeds.org.             Medication List: This is a list of all your medications and when to take them. Check marks below indicate your daily home schedule. Keep this list as a reference.      Medications           Morning Afternoon Evening Bedtime As Needed    acetaminophen 325 MG tablet   Commonly known as:  TYLENOL   Take 1-2 tablets (325-650 mg) by mouth every 4 hours as needed for other (mild pain)   Last time this was given:  975 mg on 6/8/2017  6:53 AM                                ADVIL PO                                aspirin  MG EC tablet   Take 1 tablet (325 mg) by mouth daily                                drospirenone-ethinyl estradiol 3-0.03 MG per tablet   Commonly known as:  CORY                                FLUTICASONE PROPIONATE (NASAL) NA                                 MULTI VITAMIN PO                                oxyCODONE 5 MG IR tablet   Commonly known as:  ROXICODONE   Take 1-2 tablets (5-10 mg) by mouth every 4 hours as needed for pain or other (Moderate to Severe)   Last time this was given:  5 mg on 6/8/2017 10:17 AM                                senna-docusate 8.6-50 MG per tablet   Commonly known as:  SENOKOT-S;PERICOLACE   Take 1-2 tablets by mouth 2 times daily Take while on oral narcotics to prevent or treat constipation.                                          More Information        Discharge Instructions: Using Crutches (Weight-Bearing)  Your doctor has prescribed crutches for you. A healthy leg can support your body weight, but when you have an injured leg or foot, you need to keep weight off it. Once you are told that you can put some weight on your leg, use a  weight-bearing  method of walking as the leg heals. Depending on your arm strength and balance, you can either  step to  or  step through.  Practice will help you learn to step through so that you can cover more ground with each step.      Before You Use Crutches    Remove throw rugs, electrical cords, and anything else that may cause you to fall.    Arrange your household to keep the items you need handy. Keep everything else out of the way.    Find a backpack, jovita pack, or apron, or use pockets to carry things. This will help you keep your hands free.  Standing with Crutches  Use the balanced standing (tripod) position when you start or end a movement. Also use it whenever you're standing for a length of time.    Move your crutches in front of you about 12 inches.    Find your balance.    Be sure not to rest your armpits on the pads.  Walking with Crutches    Start in a balanced standing (tripod) position.    Step forward with your affected foot.    Land lightly between your crutches.    Squeeze the pads against the sides of your chest.    Support your weight with your hands and your  affected leg.    Press down on the handgrips.  Step to    Lift your unaffected foot and step to the crutches.    Land on your unaffected foot, between your crutches.    Keep the knee slightly bent.    Reach forward and out with the crutches to begin the next step.  Step through    Lift the unaffected foot.    Step forward through the crutches.    Land on the unaffected foot, with the heel slightly in front of the toe of the other foot.    Keep the knee slightly bent.    Reach forward and out with the crutches to begin the next step.  Follow-Up  Make a follow-up appointment as directed by our staff.     When to Call Your Health Care Provider  Call your health care provider right away if you have any of the following:    Sudden or increased shortness of breath    Sudden chest pain or localized chest pain with coughing    Fever above 100.4 F (38.0 C)    Increasing redness, tenderness, or swelling at theincision site or in the injured limb    Drainage from the incision or injured limb    Opening of the incision or injury    Increasing pain, with or without activity     1105-1490 [employer]. [address] [place] [address]. All rights reserved. This information is not intended as a substitute for professional medical care. Always follow your healthcare professional's instructions.                Using Crutches: Weight-Bearing  Use a weight-bearing gait when you are told that you can put some weight on your leg as it heals. Depending on your arm strength and balance, you can either  step to  or  step through.    Balanced Standing (Tripod) Position  Use this position when you start or end a movement. or when you re standing for any length of time. Move your crutches in front of you about 12 inches. Find your balance. Don't rest your armpits on the pads.      Weight-bearing: Step to    Start in a balanced standing (tripod) position.    Step forward with your affected foot.    Land lightly between  your crutches.    Squeeze the pads against the sides of your chest.    Support your weight with your hands and your affected leg.    Press down on the handgrips.    Lift your unaffected foot and step to the crutches.    Land on your unaffected foot, between your crutches. Keep the knee slightly bent.    Reach forward and out with the crutches to begin the next step.     Weight-bearing: Step through    Start in a balanced standing (tripod) position.    Step forward with your affected foot.    Land lightly between your crutches.    Squeeze the pads against the sides of your chest.    Support your weight with your hands and your affected leg.    Press down on the handgrips.    Lift the unaffected foot and step forward through the crutches.    Land on the unaffected foot slightly in front of the toe of the other foot. Keep the knee slightly bent.    Reach forward and out with the crutches to begin the next step.     You may be told to use one of the gaits listed below:    Toe-touch or touch-down gait: Lightly touch your affected foot to the floor, and let your crutches bear most of the weight. Imagine that you re stepping on a ripe tomato: Step lightly, so it won t be squashed.    Partial weight-bearing gait: Put some weight on your affected foot as you walk. Your healthcare provider will tell you how much.    Full weight-bearing gait: Put most of your weight on your affected foot. Place only a little weight on your crutches.     6053-3552 [employer]. [address] [place] [address]. All rights reserved. This information is not intended as a substitute for professional medical care. Always follow your healthcare professional's instructions.                Using Crutches: Up and Down Steps  When climbing up and down steps, remember this rule: Up with the good (unaffected leg) and down with the bad (affected leg). Note: If you re supposed to keep all weight off your leg (non-weight-bearing), ask  your healthcare provider for special instructions.  Tip: Ask a friend to carry one of your crutches while you climb or descend stairs.     Up stairs    Hold the handrail with one hand.    If a friend is not available to carry one of the crutches, put both crutches in your other hand.    Support your weight evenly between the handrail and your crutches.    Put some weight on the crutches.    Step up with your unaffected foot.    Get your balance.    Straighten your unaffected knee and lift your body weight.    Bring your crutches and affected leg up.       Down stairs    Hold the handrail with one hand.    If a friend is not available to carry one of the crutches, put both crutches in your other hand.    Bend your unaffected knee, moving your crutches and affected leg down.    Support your weight evenly between the handrail and your crutches.    Slowly bring your unaffected leg down.    Don t hop.     Precautions for using stairs    Always use an elevator if one is available.    Have someone guard you as you learn to use stairs. A guard stands below you. He or she holds your belt (or a special  gait belt  you can borrow or buy) to assist you if you lose your balance.    When there is no handrail, keep one crutch under each arm. Follow the instructions above.    If the stairs are slippery or steep, it may be safer to lift or lower yourself from step to step while sitting. Hold both your crutches in one hand as you do so.     3430-4792 [employer]. [address] [place] [address], [place] [address]. All rights reserved. This information is not intended as a substitute for professional medical care. Always follow your healthcare professional's instructions.

## 2017-06-08 NOTE — IP AVS SNAPSHOT
[facility]    [address] [place] [id number]    Phone:  [phone]    Fax:  [phone]                                       After Visit Summary   6/8/2017    Myrtle Aguilera    MRN: [id number]           After Visit Summary Signature Page     I have received my discharge instructions, and my questions have been answered. I have discussed any challenges I see with this plan with the nurse or doctor.    ..........................................................................................................................................  Patient/Patient Representative Signature      ..........................................................................................................................................  Patient Representative Print Name and Relationship to Patient    ..................................................               ................................................  Date                                            Time    ..........................................................................................................................................  Reviewed by Signature/Title    ...................................................              ..............................................  Date                                                            Time

## 2017-06-08 NOTE — ANESTHESIA CARE TRANSFER NOTE
Patient: Myrtle Aguilera    Procedure(s):  Left Hip Arthroscopy, labral repair - Wound Class: I-Clean    Diagnosis: Left Hip Acetabular Labral Tear  Diagnosis Additional Information: No value filed.    Anesthesia Type:   General     Note:  Airway :Face Mask  Patient transferred to:PACU  Comments: VSS/WNL. Responds slowly to name.      Vitals: (Last set prior to Anesthesia Care Transfer)    CRNA VITALS  6/8/2017 0931 - 6/8/2017 1005      6/8/2017             Pulse: 87    SpO2: 97 %    Resp Rate (observed): 10                Electronically Signed By: MARCE Chávez CRNA  June 8, 2017  10:05 AM

## 2017-06-08 NOTE — ANESTHESIA PREPROCEDURE EVALUATION
Anesthesia Evaluation     . Pt has had prior anesthetic. Type: General    No history of anesthetic complications          ROS/MED HX    ENT/Pulmonary:  - neg pulmonary ROS     Neurologic:  - neg neurologic ROS     Cardiovascular:  - neg cardiovascular ROS   (+) ----. : . . . :. . No previous cardiac testing       METS/Exercise Tolerance:  >4 METS   Hematologic:  - neg hematologic  ROS       Musculoskeletal:  - neg musculoskeletal ROS       GI/Hepatic:  - neg GI/hepatic ROS       Renal/Genitourinary:  - ROS Renal section negative       Endo:  - neg endo ROS       Psychiatric:  - neg psychiatric ROS       Infectious Disease:  - neg infectious disease ROS       Malignancy:      - no malignancy   Other:    - neg other ROS                 Physical Exam  Normal systems: pulmonary and dental    Airway   Mallampati: II  TM distance: >3 FB  Neck ROM: full    Dental     Cardiovascular   Rhythm and rate: regular and normal      Pulmonary                        Lab / Radiology Results:   Reviewed current labs when avail, see EMR for details.      BMP:  No results for input(s): NA, POTASSIUM, CHLORIDE, CO2, BUN, CR, GLC, SANCHEZ in the last 77228 hours.    Invalid input(s): MG    LFTs:   No results for input(s): PROTTOTAL, ALBUMIN, BILITOTAL, ALKPHOS, AST, ALT, BILIDIRECT in the last 72592 hours.    CBC:   No results for input(s): WBC, HGB, PLT in the last 74237 hours.    Coags:  No results for input(s): INR, PTT, FIBR in the last 20468 hours.    Blood Bank:  No results found for: ABO, RH, AS    Studies:  See EMR for current studies, reviewed when available.      Anesthesia Plan      History & Physical Review  History and physical reviewed and following examination; no interval change.    ASA Status:  1 .    NPO Status:  > 8 hours    Plan for General and LMA with Intravenous induction. Maintenance will be TIVA.    PONV prophylaxis:  Ondansetron (or other 5HT-3) and Dexamethasone or Solumedrol       Postoperative  Care  Postoperative pain management:  Multi-modal analgesia.      Consents  Anesthetic plan, risks, benefits and alternatives discussed with:  Patient.  Use of blood products discussed: No .   .      Josesito Christianson MD  Anesthesiologist  7:20 AM  June 8, 2017                        .

## 2017-06-08 NOTE — OP NOTE
OPERATIVE REPORT    DATE OF SERVICE: 6/8/17    SURGEON:  Frank Lainez MD.    ASSISTANT: Frank WOOD and Jet Garcia MD ortho fellow     PREOPERATIVE DIAGNOSIS:  1. Labral tear    POSTOPERATIVE DIAGNOSIS:  1.  Labral tear    OPERATION PERFORMED: left hip arthroscopy and labral repair    IMPLANT(S): five 3 mm PEEK knotless suture tacks evenly spaced between 3:30 and 11:00     ANESTHESIA: General    ESTIMATED BLOOD LOSS:  10 mL    TRACTION TIME: about 1.5 hours     COMPLICATIONS:  None apparent    OPERATIVE FINDINGS:  1. Acetabular cartilage: carpet lesion with medial based flap from 1:00 to 11:00  2. Labrum: degenerative tissue and gross detachment from 4:00 to 10:30  3. Femoral head central compartment: softening, superior cartilage thinning  4. Femoral head-neck junction: impaction trough posterior at the head neck junction    DESCRIPTION OF PROCEDURE:  The patient was identified in the pre-operative holding area.  The consent form including the risks and benefits of the procedure was reviewed with the patient.  The operative limb was identified and marked.  The patient was brought back to the operating room and placed supine on the operating table.  A pre-operative brief was performed.  A general anesthetic was induced by the anesthesia team.  The patient was placed against a well padded perineal post.  All bony prominences were well padded.  The patient was positioned in the normal, standard fashion for hip arthroscopy.  A time-out was called, the consent reviewed, and the operative marking visualized.  The limb was placed momentarily on traction under fluoroscopy to assure adequate surgical access was available.  Traction was then immediately released.  The patient was prepped and draped in the normal standard fashion for hip arthroscopy.  The patient s limb was placed on traction.  Fluoroscopy was used to establish and anterior, peritrochanteric portal using a guide-wire technique.  A camera was  inserted atraumatically into the joint.  A distal lateral portal was established using a guide-wire technique under direct visualization.  A small capsulotomy was performed around each portals.   A diagnostic arthroscopy of the central compartment was performed with findings as above.  We began by preparing the acetabular rim for labral repair creating a bed of bleeding bone for refixation.  We then placed anchors and sutures as described above. The repair was stable. Traction was let down.  The surgical site was injected with analgesic.  The portals were closed with Vicryl and nylon.  Sterile dressings were applied.  The patient tolerated the procedure well and returned to the PAR extubated and stable.    POSTOPERATIVE PLAN:  1. Disposition:   discharged to home after meeting PAR criteria  2. Weight bearin lb weight bearing for 4 weeks with brace on while up for 4 weeks  3. Protocol:  standard   4. DVT:  TEDS, SCDs, immediate mobilization  5. Pain medication  6. Follow-up:  wound clinic in 2 weeks and [facility] in 6 weeks  7. PT:  outpt PT, scheduled

## 2017-06-08 NOTE — BRIEF OP NOTE
Orthopaedic Surgery Brief Op-Note     Patient: Myrtle Aguilera  : 1975  Date of Service: 2017 7:57 AM    Preoperative Diagnosis: Left Hip Acetabular Labral Tear     Postoperative Diagnosis: same    Procedure: Procedure(s):  Left Hip Arthroscopy - Wound Class: I-Clean    Surgeon: Dr. Lainez    Assistants:  Jet Pelletier PA-C    Anesthesia: General     Estimated Blood Loss: 10 cc    Specimen: none       Complications: none    Condition: stable    Findings: please see dictated operative note    Plan:  Partial weight bearing: do not exceed 50 pounds of weight bearing to operative extremity; crutches will be provided to the patient at time of discharge. Crutches are to be used for one week following surgery.   Orthotic: to be worn at all times while patient is standing and ambulating  CPM: no CPM required for this procedure  PT as outpatient; prescription will be provided at time of discharge  ADAT  Pain control with orals  Bowel prophylaxis with senna-docusate  DVT prophylaxis: mechanical with  mg qd x 28 days     Disposition: patient may be discharged to home with  once appropriate discharge criteria have been met    I assisted with positioning, prepping and draping, and closure.      Frank Pelletier PA-C  Orthopaedic Surgery    Please page me at [phone] with any questions/concerns. If there is no response, if it is a weekend, or if it is during evening hours, please page the orthopaedic surgery resident on call.

## 2017-06-08 NOTE — ANESTHESIA POSTPROCEDURE EVALUATION
Patient: Myrtle Aguilera    Procedure(s):  Left Hip Arthroscopy, labral repair - Wound Class: I-Clean    Diagnosis:Left Hip Acetabular Labral Tear  Diagnosis Additional Information: No value filed.    Anesthesia Type:  General    Note:  Anesthesia Post Evaluation    Patient location during evaluation: PACU  Patient participation: Able to fully participate in evaluation  Level of consciousness: awake and alert  Pain management: adequate  Airway patency: patent  Cardiovascular status: acceptable and hemodynamically stable  Respiratory status: acceptable  Hydration status: acceptable  PONV: none     Anesthetic complications: None          Last vitals:  Vitals:    06/08/17 1030 06/08/17 1100 06/08/17 1130   BP: 115/72 125/53 124/78   Pulse:      Resp: 16 16 16   Temp:  36.4  C (97.5  F)    SpO2: 100% 100%          Electronically Signed By: Josesito Christianson MD  June 8, 2017  12:06 PM

## 2017-06-16 ASSESSMENT — ENCOUNTER SYMPTOMS
NAIL CHANGES: 0
CONSTIPATION: 0
POLYPHAGIA: 0
INCREASED ENERGY: 0
DECREASED LIBIDO: 0
SKIN CHANGES: 0
NIGHT SWEATS: 0
FATIGUE: 1
JAUNDICE: 0
WEIGHT GAIN: 0
SMELL DISTURBANCE: 0
TROUBLE SWALLOWING: 0
HEARTBURN: 0
VOMITING: 0
HOARSE VOICE: 0
BLOOD IN STOOL: 0
WEIGHT LOSS: 0
POOR WOUND HEALING: 0
NECK MASS: 0
BOWEL INCONTINENCE: 0
BLOATING: 0
FEVER: 0
HOT FLASHES: 0
DECREASED APPETITE: 1
CHILLS: 0
SINUS CONGESTION: 0
DIARRHEA: 1
HALLUCINATIONS: 0
POLYDIPSIA: 0
RECTAL PAIN: 0
SINUS PAIN: 0
RECTAL BLEEDING: 0
ALTERED TEMPERATURE REGULATION: 0
TASTE DISTURBANCE: 0
SORE THROAT: 0
NAUSEA: 0
ABDOMINAL PAIN: 0

## 2017-06-21 ENCOUNTER — OFFICE VISIT (OUTPATIENT)
Dept: ORTHOPEDICS | Facility: CLINIC | Age: 42
End: 2017-06-21

## 2017-06-21 DIAGNOSIS — Z98.890 STATUS POST ARTHROSCOPY OF HIP: ICD-10-CM

## 2017-06-21 NOTE — MR AVS SNAPSHOT
After Visit Summary   6/21/2017    Myrtle Aguilera    MRN: [id number]           Patient Information     Date Of Birth          1975        Visit Information        Provider Department      6/21/2017 10:00 AM Nurse, [facility]        Today's Diagnoses     Status post arthroscopy of hip           Follow-ups after your visit        Your next 10 appointments already scheduled     Jul 27, 2017  2:00 PM CDT   (Arrive by 1:45 PM)   Post-Op with Frank Lainez MD   [facility] ([facility] [place])    [address] [place] [address] [id number] [phone]              Who to contact     Please call your clinic at [phone] to:    Ask questions about your health    Make or cancel appointments    Discuss your medicines    Learn about your test results    Speak to your doctor   If you have compliments or concerns about an experience at your clinic, or if you wish to file a complaint, please contact [facility] Patient Relations at [phone] or email us at [email or web address]         Additional Information About Your Visit        MyChart Information     [employer] gives you secure access to your electronic health record. If you see a primary care provider, you can also send messages to your care team and make appointments. If you have questions, please call your primary care clinic.  If you do not have a primary care provider, please call [phone] and they will assist you.      [employer] is an electronic gateway that provides easy, online access to your medical records. With [employer], you can request a clinic appointment, read your test results, renew a prescription or communicate with your care team.     To access your existing account, please contact your [facility] or call [phone] for assistance.        Care EveryWhere ID     This is your Care  EveryWhere ID. This could be used by other organizations to access your [place] medical records  [id number]        Your Vitals Were     Last Period                   05/22/2017            Blood Pressure from Last 3 Encounters:   06/08/17 124/78    Weight from Last 3 Encounters:   06/08/17 78.5 kg (173 lb)   03/09/17 78.3 kg (172 lb 11.2 oz)   02/16/17 78.8 kg (173 lb 12.8 oz)              Today, you had the following     No orders found for display       Primary Care Provider    None Specified       No primary provider on file.        Equal Access to Services     [facility]: Hadii juliet roman hadheideo Soigor, waaxda luqadaha, qaybta kaalmalee bustamante, kamyrn judd . So [facility] [phone].    ATENCIÓN: Si habla español, tiene a alexander disposición servicios gratuitos de asistencia lingüística. Ll[facility] [phone].    We comply with applicable federal civil rights laws and [place] laws. We do not discriminate on the basis of race, color, national origin, age, disability sex, sexual orientation or gender identity.            Thank you!     Thank you for choosing [facility]  for your care. Our goal is always to provide you with excellent care. Hearing back from our patients is one way we can continue to improve our services. Please take a few minutes to complete the written survey that you may receive in the mail after your visit with us. Thank you!             Your Updated Medication List - Protect others around you: Learn how to safely use, store and throw away your medicines at www.disposemymeds.org.          This list is accurate as of: 6/21/17  1:57 PM.  Always use your most recent med list.                   Brand Name Dispense Instructions for use Diagnosis    acetaminophen 325 MG tablet    TYLENOL    100 tablet    Take 1-2 tablets (325-650 mg) by mouth every 4 hours as needed for other (mild pain)    Status post arthroscopy of hip       ADVIL PO           aspirin EC  325 MG EC tablet     28 tablet    Take 1 tablet (325 mg) by mouth daily    Status post arthroscopy of hip       drospirenone-ethinyl estradiol 3-0.03 MG per tablet    CORY          FLUTICASONE PROPIONATE (NASAL) NA           MULTI VITAMIN PO           oxyCODONE 5 MG IR tablet    ROXICODONE    40 tablet    Take 1-2 tablets (5-10 mg) by mouth every 4 hours as needed for pain or other (Moderate to Severe)    Status post arthroscopy of hip       senna-docusate 8.6-50 MG per tablet    SENOKOT-S;PERICOLACE    26 tablet    Take 1-2 tablets by mouth 2 times daily Take while on oral narcotics to prevent or treat constipation.    Status post arthroscopy of hip

## 2017-06-21 NOTE — PROGRESS NOTES
Reason for visit:    Myrtle Aguilera came in to the clinic for a two week post op check.    Her surgery was done on 6/8/2017 by Dr Lainez.  She had left hip arthroscopy and labral repair.     Assessment:    Myrtle came into the clinic ambulating with crutches; she is to remain weightbearing of 50 pounds for 4 weeks postop. Patient states her pain is well managed. She stopped narcotics the day after surgery. She has physical therapy 2 times per week as well as doing her exercises at home. She is taking aspirin and icing frequently. She inquired regarding driving; instructed patient that as she has a manual transmission, she should wait until at least 4 weeks when she is weightbearing as tolerated.      The Surgical wounds were exposed and found to be healing well without signs of infection; so the sutures were removed after cleansing with Chloraprep. Steri-Strips were placed and patient instructed to allow these to fall off on their own. Reinforced no submerging wound until completely healed.    Plan:    She has an appointment to see Dr. Lainez at 6 weeks postop. At that time Dr. Lainez will determine further restrictions.    She has our phone number and will call with questions or problems.

## 2017-07-13 ASSESSMENT — ENCOUNTER SYMPTOMS
NECK PAIN: 0
MUSCLE WEAKNESS: 0
MUSCLE CRAMPS: 0
MYALGIAS: 1
BACK PAIN: 1
STIFFNESS: 1
ARTHRALGIAS: 1
JOINT SWELLING: 0

## 2017-07-24 ASSESSMENT — ACTIVITIES OF DAILY LIVING (ADL)
ADL_MEAN: .47
ADL_SUM: 8
ADL_SUBSCALE_SCORE: 88.23

## 2017-07-24 ASSESSMENT — HOOS S4: HOW SEVERE IS YOUR HIP JOINT STIFFNESS AFTER FIRST WAKENING IN THE MORNING?: MILD

## 2017-07-27 ENCOUNTER — OFFICE VISIT (OUTPATIENT)
Dept: ORTHOPEDICS | Facility: CLINIC | Age: 42
End: 2017-07-27

## 2017-07-27 VITALS — WEIGHT: 176.9 LBS | BODY MASS INDEX: 23.96 KG/M2 | HEIGHT: 72 IN

## 2017-07-27 DIAGNOSIS — Z47.89 ORTHOPEDIC AFTERCARE: Primary | ICD-10-CM

## 2017-07-27 NOTE — NURSING NOTE
"Reason For Visit:   Chief Complaint   Patient presents with     Surgical Followup     S/P Left Hip Arthroscopy, labral repair. DOS: 06/08/2017.       Pain Assessment  Patient Currently in Pain: No               HEIGHT: 6' .25\", WEIGHT: 176 lbs 14.4 oz, BMI: Body mass index is 23.83 kg/(m^2).      Current Outpatient Prescriptions   Medication Sig Dispense Refill     drospirenone-ethinyl estradiol (CORY) 3-0.03 MG per tablet        FLUTICASONE PROPIONATE, NASAL, NA        Multiple Vitamin (MULTI VITAMIN PO)             Allergies   Allergen Reactions     Ibuprofen      Other reaction(s): Nausea  In large doses     Other  [No Clinical Screening - See Comments]      Other reaction(s): Other, see comments  Seasonal- runny nose, sneezing, sinus congestion           "

## 2017-07-27 NOTE — MR AVS SNAPSHOT
"              After Visit Summary   7/27/2017    Myrtle Aguilera    MRN: [id number]           Patient Information     Date Of Birth          1975        Visit Information        Provider Department      7/27/2017 2:00 PM Frank Lainez MD [facility]        Today's Diagnoses     Orthopedic aftercare    -  1       Follow-ups after your visit        Who to contact     Please call your clinic at [phone] to:    Ask questions about your health    Make or cancel appointments    Discuss your medicines    Learn about your test results    Speak to your doctor   If you have compliments or concerns about an experience at your clinic, or if you wish to file a complaint, please contact [facility] Patient Relations at [phone] or email us at [email or web address]         Additional Information About Your Visit        MyChart Information     [employer] gives you secure access to your electronic health record. If you see a primary care provider, you can also send messages to your care team and make appointments. If you have questions, please call your primary care clinic.  If you do not have a primary care provider, please call [phone] and they will assist you.      [employer] is an electronic gateway that provides easy, online access to your medical records. With [employer], you can request a clinic appointment, read your test results, renew a prescription or communicate with your care team.     To access your existing account, please contact your [facility] or call [phone] for assistance.        Care EveryWhere ID     This is your Care EveryWhere ID. This could be used by other organizations to access your [place] medical records  [id number]        Your Vitals Were     Height BMI (Body Mass Index)                1.835 m (6' 0.25\") 23.83 kg/m2           Blood Pressure from Last 3 Encounters:   06/08/17 124/78    Weight from " Last 3 Encounters:   07/27/17 80.2 kg (176 lb 14.4 oz)   06/08/17 78.5 kg (173 lb)   03/09/17 78.3 kg (172 lb 11.2 oz)              Today, you had the following     No orders found for display       Primary Care Provider    None Specified       No primary provider on file.        Equal Access to Services     [facility] : Hadjosé antonio Randle, wagay rodgers, dianeta kaalmada robby, kamryn henryashleyjoseph judd . So [facility] [phone].    ATENCIÓN: Si habla español, tiene a alexander disposición servicios gratuitos de asistencia lingüística. Jemimaame al [phone].    We comply with applicable federal civil rights laws and [place] laws. We do not discriminate on the basis of race, color, national origin, age, disability sex, sexual orientation or gender identity.            Thank you!     Thank you for choosing [facility]  for your care. Our goal is always to provide you with excellent care. Hearing back from our patients is one way we can continue to improve our services. Please take a few minutes to complete the written survey that you may receive in the mail after your visit with us. Thank you!             Your Updated Medication List - Protect others around you: Learn how to safely use, store and throw away your medicines at www.disposemymeds.org.          This list is accurate as of: 7/27/17  2:02 PM.  Always use your most recent med list.                   Brand Name Dispense Instructions for use Diagnosis    drospirenone-ethinyl estradiol 3-0.03 MG per tablet    CORY          FLUTICASONE PROPIONATE (NASAL) NA           MULTI VITAMIN PO

## 2017-07-27 NOTE — LETTER
"7/27/2017       RE: Myrtle Aguilera  [address] [address]     Dear Colleague,    Thank you for referring your patient, Myrtle Aguilera, to the [facility] at [facility]. Please see a copy of my visit note below.    Chief Complaint: Surgical Followup (S/P Left Hip Arthroscopy, labral repair. DOS: 06/08/2017.)    HPI: Myrtle Aguilera returns today in follow-up for her left hip. She reports that she is doing well. She is advancing well in the physical therapy. She is using no pain medication. She is walking 2 miles at time. She is happy with how she is doing so far.     Medications and allergies are documented in the EMR and have been reviewed.      Current Outpatient Prescriptions:      drospirenone-ethinyl estradiol (CORY) 3-0.03 MG per tablet, , Disp: , Rfl:      FLUTICASONE PROPIONATE, NASAL, NA, , Disp: , Rfl:      Multiple Vitamin (MULTI VITAMIN PO), , Disp: , Rfl:     Allergies: Ibuprofen and Other  [no clinical screening - see comments]    Current Status:  Results of the patient s Hip Disability and Osteoarthritis Outcome Score (HOOS)  are as follows (0-100 scales with 100 being the theoretical best):  Pain: 82.5  Symptom:75  AD88.23LS:  Sports:62.5   Quality of life: 50    (http://koos.nu/)     UCLA activity score:    Performance test:     Physical Exam:  On physical examination the patient appears the stated age, is in no acute distress, affect is appropriate, and breathing is non-labored.    Ht 1.835 m (6' 0.25\")  Wt 80.2 kg (176 lb 14.4 oz)  BMI 23.83 kg/m2  Body mass index is 23.83 kg/(m^2).    Gait: normal   Incisions are healed and benign   ROM:  Fluid and painless. Sore at th end range of flexion mild and with IRF also mild   Flexion: 100  IRF:0    Assessment: doing well     Plan: Cont with PT per protocol and RTC in 6 weeks for a recheck, sooner if any issues.     Unknown Referring Dr    Again, thank you for allowing me to " participate in the care of your patient.      Sincerely,    Frank Lainez MD

## 2017-07-27 NOTE — PROGRESS NOTES
"Chief Complaint: Surgical Followup (S/P Left Hip Arthroscopy, labral repair. DOS: 06/08/2017.)         HPI: Myrtle Aguilera returns today in follow-up for her left hip. She reports that she is doing well. She is advancing well in the physical therapy. She is using no pain medication. She is walking 2 miles at time. She is happy with how she is doing so far.     Medications and allergies are documented in the EMR and have been reviewed.      Current Outpatient Prescriptions:      drospirenone-ethinyl estradiol (CORY) 3-0.03 MG per tablet, , Disp: , Rfl:      FLUTICASONE PROPIONATE, NASAL, NA, , Disp: , Rfl:      Multiple Vitamin (MULTI VITAMIN PO), , Disp: , Rfl:     Allergies: Ibuprofen and Other  [no clinical screening - see comments]    Current Status:  Results of the patient s Hip Disability and Osteoarthritis Outcome Score (HOOS)  are as follows (0-100 scales with 100 being the theoretical best):  Pain: 82.5  Symptom:75  AD88.23LS:  Sports:62.5   Quality of life: 50    (http://koos.nu/)     UCLA activity score:    Performance test:     Physical Exam:  On physical examination the patient appears the stated age, is in no acute distress, affect is appropriate, and breathing is non-labored.    Ht 1.835 m (6' 0.25\")  Wt 80.2 kg (176 lb 14.4 oz)  BMI 23.83 kg/m2  Body mass index is 23.83 kg/(m^2).    Gait: normal   Incisions are healed and benign   ROM:  Fluid and painless. Sore at th end range of flexion mild and with IRF also mild   Flexion: 100  IRF:0    Assessment: doing well     Plan: Cont with PT per protocol and RTC in 6 weeks for a recheck, sooner if any issues.     Unknown Referring Dr      Answers for HPI/ROS submitted by the patient on 7/13/2017   General Symptoms: No  Skin Symptoms: No  HENT Symptoms: No  EYE SYMPTOMS: No  HEART SYMPTOMS: No  LUNG SYMPTOMS: No  INTESTINAL SYMPTOMS: No  URINARY SYMPTOMS: No  GYNECOLOGIC SYMPTOMS: No  BREAST SYMPTOMS: No  SKELETAL SYMPTOMS: Yes  BLOOD SYMPTOMS: " No  NERVOUS SYSTEM SYMPTOMS: No  MENTAL HEALTH SYMPTOMS: No  Back pain: Yes  Muscle aches: Yes  Neck pain: No  Swollen joints: No  Joint pain: Yes  Bone pain: No  Muscle cramps: No  Muscle weakness: No  Joint stiffness: Yes  Bone fracture: No

## 2017-08-05 ENCOUNTER — HEALTH MAINTENANCE LETTER (OUTPATIENT)
Age: 42
End: 2017-08-05

## 2017-09-02 ASSESSMENT — ACTIVITIES OF DAILY LIVING (ADL)
ADL_MEAN: 0
ADL_SUBSCALE_SCORE: 100
ADL_SUM: 0

## 2017-09-02 ASSESSMENT — ENCOUNTER SYMPTOMS
NAUSEA: 0
MYALGIAS: 1
DIARRHEA: 1
BLOOD IN STOOL: 0
JOINT SWELLING: 0
NECK PAIN: 1
VOMITING: 0
BOWEL INCONTINENCE: 0
BACK PAIN: 1
CONSTIPATION: 0
HEARTBURN: 1
ABDOMINAL PAIN: 1
RECTAL PAIN: 0
JAUNDICE: 0
MUSCLE WEAKNESS: 0
MUSCLE CRAMPS: 0
STIFFNESS: 1
BLOATING: 1
RECTAL BLEEDING: 0
ARTHRALGIAS: 1

## 2017-09-07 ENCOUNTER — OFFICE VISIT (OUTPATIENT)
Dept: ORTHOPEDICS | Facility: CLINIC | Age: 42
End: 2017-09-07

## 2017-09-07 VITALS — BODY MASS INDEX: 23.72 KG/M2 | HEIGHT: 72 IN | WEIGHT: 175.1 LBS

## 2017-09-07 DIAGNOSIS — Z47.89 ORTHOPEDIC AFTERCARE: Primary | ICD-10-CM

## 2017-09-07 NOTE — NURSING NOTE
"Reason For Visit:   Chief Complaint   Patient presents with     Surgical Followup     S/P Left Hip Arthroscopy, labral repair. DOS: 06/08/2017.                       HEIGHT: 6' .25\", WEIGHT: 175 lbs 1.6 oz, BMI: Body mass index is 23.58 kg/(m^2).      Current Outpatient Prescriptions   Medication Sig Dispense Refill     drospirenone-ethinyl estradiol (CORY) 3-0.03 MG per tablet        FLUTICASONE PROPIONATE, NASAL, NA        Multiple Vitamin (MULTI VITAMIN PO)             Allergies   Allergen Reactions     Ibuprofen      Other reaction(s): Nausea  In large doses     Other  [No Clinical Screening - See Comments]      Other reaction(s): Other, see comments  Seasonal- runny nose, sneezing, sinus congestion               "

## 2017-09-07 NOTE — LETTER
2017       RE: Myrtle Aguilera  [address] [facility] [address] [facility] [address]     Dear Colleague,    Thank you for referring your patient, Myrtle Aguilera, to the [facility] at [facility]. Please see a copy of my visit note below.    12 weeks left acetabular labral repair    Myrtle returns today for her hip. She reports that she is doing well, has no hip pain, and is anxious to return to higher level activities like kick boxing.     HOOS   Pain: 95  Symptoms: 90  Activities of Daily Livin  Sport: 93.75  Quality of Life: 81.25    On physical examination she rises easily from a chair and walks with a normal gait. Her hip ROM is fluid and painless. Her adductors bilateral are a little tender, symmetric.     Assessment: doing very well. Discussed safe return to activities and the types of activities that I would not recommend for her hip.   Plan: RTC at one year, sooner if issues.     Twenty minutes were spent with the patient with greater than 50% on counseling and coordination of care.     Sincerely,    Frank Lainez MD

## 2017-09-08 NOTE — PROGRESS NOTES
12 weeks left acetabular labral repair    Myrtle returns today for her hip. She reports that she is doing well, has no hip pain, and is anxious to return to higher level activities like kick boxing.     HOOS   Pain: 95  Symptoms: 90  Activities of Daily Livin  Sport: 93.75  Quality of Life: 81.25    On physical examination she rises easily from a chair and walks with a normal gait. Her hip ROM is fluid and painless. Her adductors bilateral are a little tender, symmetric.     Assessment: doing very well. Discussed safe return to activities and the types of activities that I would not recommend for her hip.   Plan: RTC at one year, sooner if issues.     Twenty minutes were spent with the patient with greater than 50% on counseling and coordination of care.     Answers for HPI/ROS submitted by the patient on 2017   General Symptoms: No  Skin Symptoms: No  HENT Symptoms: No  EYE SYMPTOMS: No  HEART SYMPTOMS: No  LUNG SYMPTOMS: No  INTESTINAL SYMPTOMS: Yes  URINARY SYMPTOMS: No  GYNECOLOGIC SYMPTOMS: No  BREAST SYMPTOMS: No  SKELETAL SYMPTOMS: Yes  BLOOD SYMPTOMS: No  NERVOUS SYSTEM SYMPTOMS: No  MENTAL HEALTH SYMPTOMS: No  Heart burn or indigestion: Yes  Nausea: No  Vomiting: No  Abdominal pain: Yes  Bloating: Yes  Constipation: No  Diarrhea: Yes  Blood in stool: No  Black stools: No  Rectal or Anal pain: No  Fecal incontinence: No  Rectal bleeding: No  Yellowing of skin or eyes: No  Vomit with blood: No  Change in stools: No  Hemorrhoids: No  Back pain: Yes  Muscle aches: Yes  Neck pain: Yes  Swollen joints: No  Joint pain: Yes  Bone pain: No  Muscle cramps: No  Muscle weakness: No  Joint stiffness: Yes  Bone fracture: No

## 2018-04-26 ASSESSMENT — HOOS S4: HOW SEVERE IS YOUR HIP JOINT STIFFNESS AFTER FIRST WAKENING IN THE MORNING?: SEVERE

## 2018-04-26 ASSESSMENT — ACTIVITIES OF DAILY LIVING (ADL)
ADL_SUBSCALE_SCORE: 79.41
ADL_MEAN: .82
ADL_SUM: 14

## 2018-05-10 ENCOUNTER — RADIANT APPOINTMENT (OUTPATIENT)
Dept: GENERAL RADIOLOGY | Facility: CLINIC | Age: 43
End: 2018-05-10
Attending: ORTHOPAEDIC SURGERY
Payer: COMMERCIAL

## 2018-05-10 ENCOUNTER — OFFICE VISIT (OUTPATIENT)
Dept: ORTHOPEDICS | Facility: CLINIC | Age: 43
End: 2018-05-10
Payer: COMMERCIAL

## 2018-05-10 VITALS — BODY MASS INDEX: 23.76 KG/M2 | HEIGHT: 72 IN | WEIGHT: 175.4 LBS

## 2018-05-10 DIAGNOSIS — G89.29 CHRONIC LEFT HIP PAIN: ICD-10-CM

## 2018-05-10 DIAGNOSIS — M25.552 CHRONIC LEFT HIP PAIN: Primary | ICD-10-CM

## 2018-05-10 DIAGNOSIS — G89.29 CHRONIC LEFT HIP PAIN: Primary | ICD-10-CM

## 2018-05-10 DIAGNOSIS — M25.552 CHRONIC LEFT HIP PAIN: ICD-10-CM

## 2018-05-10 ASSESSMENT — ENCOUNTER SYMPTOMS
ARTHRALGIAS: 1
BACK PAIN: 0
MUSCLE WEAKNESS: 1
MYALGIAS: 1
MUSCLE CRAMPS: 0
JOINT SWELLING: 0
STIFFNESS: 1
NECK PAIN: 0

## 2018-05-10 NOTE — NURSING NOTE
"Reason For Visit:   Chief Complaint   Patient presents with     RECHECK     Pt. states that she is here today for Left Hip Pain. She mention that she is still having some groin and thigh muscle pain. She had increase her activity about a month ago, and after that she is experiencing the same pain she had before her first surgery.HX: Left Hip Arthroscopy, labral repair. DOS: 06/08/2017.       Pain Assessment  Patient Currently in Pain: No               HEIGHT: 6' .25\", WEIGHT: 175 lbs 6.4 oz, BMI: Body mass index is 23.62 kg/(m^2).      Current Outpatient Prescriptions   Medication Sig Dispense Refill     FLUTICASONE PROPIONATE, NASAL, NA        Multiple Vitamin (MULTI VITAMIN PO)             Allergies   Allergen Reactions     Ibuprofen      Other reaction(s): Nausea  In large doses     Other  [No Clinical Screening - See Comments]      Other reaction(s): Other, see comments  Seasonal- runny nose, sneezing, sinus congestion               "

## 2018-05-10 NOTE — MR AVS SNAPSHOT
"              After Visit Summary   5/10/2018    Myrtle Aguilera    MRN: [id number]           Patient Information     Date Of Birth          1975        Visit Information        Provider Department      5/10/2018 6:00 PM Frank Lainez MD [facility]        Today's Diagnoses     Chronic left hip pain    -  1       Follow-ups after your visit        Who to contact     Please call your clinic at [phone] to:    Ask questions about your health    Make or cancel appointments    Discuss your medicines    Learn about your test results    Speak to your doctor            Additional Information About Your Visit        MyChart Information     [employer] gives you secure access to your electronic health record. If you see a primary care provider, you can also send messages to your care team and make appointments. If you have questions, please call your primary care clinic.  If you do not have a primary care provider, please call [phone] and they will assist you.      [employer] is an electronic gateway that provides easy, online access to your medical records. With [employer], you can request a clinic appointment, read your test results, renew a prescription or communicate with your care team.     To access your existing account, please contact your [facility] or call [phone] for assistance.        Care EveryWhere ID     This is your Care EveryWhere ID. This could be used by other organizations to access your [facility] medical records  [id number]        Your Vitals Were     Height BMI (Body Mass Index)                1.835 m (6' 0.25\") 23.62 kg/m2           Blood Pressure from Last 3 Encounters:   06/08/17 124/78    Weight from Last 3 Encounters:   05/10/18 79.6 kg (175 lb 6.4 oz)   09/07/17 79.4 kg (175 lb 1.6 oz)   07/27/17 80.2 kg (176 lb 14.4 oz)               Primary Care Provider    None Specified       No primary provider on file.        Equal Access to " Services     [facility]: Hadii aad ku hadheiderajesh Randle, waveronicada luqadaha, qaybta kaalmalee bustamante, kamryn parmar. So [facility] [phone].    ATENCIÓN: Si habla español, tiene a alexander disposición servicios gratuitos de asistencia lingüística. Llame al [phone].    We comply with applicable federal civil rights laws and [place] laws. We do not discriminate on the basis of race, color, national origin, age, disability, sex, sexual orientation, or gender identity.            Thank you!     Thank you for choosing [facility]  for your care. Our goal is always to provide you with excellent care. Hearing back from our patients is one way we can continue to improve our services. Please take a few minutes to complete the written survey that you may receive in the mail after your visit with us. Thank you!             Your Updated Medication List - Protect others around you: Learn how to safely use, store and throw away your medicines at www.disposemymeds.org.          This list is accurate as of 5/10/18 11:59 PM.  Always use your most recent med list.                   Brand Name Dispense Instructions for use Diagnosis    FLUTICASONE PROPIONATE (NASAL) NA           MULTI VITAMIN PO

## 2018-05-10 NOTE — PROGRESS NOTES
"Chief Complaint: RECHECK (Pt. states that she is here today for Left Hip Pain. She mention that she is still having some groin and thigh muscle pain. She had increase her activity about a month ago, and after that she is experiencing the same pain she had before her first surgery.HX: Left Hip Arthroscopy, labral repair. DOS: 06/08/2017.)         HPI: Myrtle Aguilera returns today in follow-up for her left hip. She is about a year now lk    Returned to work:     Medications and allergies are documented in the EMR and have been reviewed.      Current Outpatient Prescriptions:      FLUTICASONE PROPIONATE, NASAL, NA, , Disp: , Rfl:      Multiple Vitamin (MULTI VITAMIN PO), , Disp: , Rfl:     Allergies: Ibuprofen and Other  [no clinical screening - see comments]    Current Status:  Results of the patient s Hip Disability and Osteoarthritis Outcome Score (HOOS)  are as follows (0-100 scales with 100 being the theoretical best):  Pain: 67.5  Symptom: 30  ADLS: 79.41  Sports: 50  Quality of life:  37.5  (http://koos.nu/)     Physical Exam:  On physical examination the patient appears the stated age, is in no acute distress, affect is appropriate, and breathing is non-labored.    Ht 1.835 m (6' 0.25\")  Wt 79.6 kg (175 lb 6.4 oz)  BMI 23.62 kg/m2  Body mass index is 23.62 kg/(m^2).    Gait: normal  Both hips are pretty irritated today, both with hip examination and with TTP about the joint at the iliac crest, rectus tendon, and greater trochanter.   Right hip ROM is a little irritated and the anterior impingement test produces groin pain.   Gains exam table: easily   ROM:  A little irritated but more so on the left   Flexion: 105 symmetric with soreness bilateral, more on the left   Distally, the circulatory, motor, and sensation exam is intact with 5/5 EHL, gastroc-soleus, and tibialis anterior.  Sensation to light touch is intact.  Dorsalis pedis and posterior tibialis pulses are palpable.  There are no sores on the feet, " no bruising, and no lymphedema.    Assessment and Plan: bilateral hip issues, the improvement that she experienced post-operatively has abated to a significant degree and we discussed the options that are available for her hip, the anterior undercoverage that she has, and the activity goals that she has. We discussed intra-articular injection and she is not interested. We discussed physical therapy and she is doing to consider this. She is going to be traveling for much of the next three to four months. She is going to RTC to discuss further how she is doing.     Referred Self      Answers for HPI/ROS submitted by the patient on 5/10/2018   General Symptoms: No  Skin Symptoms: No  HENT Symptoms: No  EYE SYMPTOMS: No  HEART SYMPTOMS: No  LUNG SYMPTOMS: No  INTESTINAL SYMPTOMS: No  URINARY SYMPTOMS: No  GYNECOLOGIC SYMPTOMS: No  BREAST SYMPTOMS: No  SKELETAL SYMPTOMS: Yes  BLOOD SYMPTOMS: No  NERVOUS SYSTEM SYMPTOMS: No  MENTAL HEALTH SYMPTOMS: No  Back pain: No  Muscle aches: Yes  Neck pain: No  Swollen joints: No  Joint pain: Yes  Bone pain: No  Muscle cramps: No  Muscle weakness: Yes  Joint stiffness: Yes  Bone fracture: No

## 2018-05-10 NOTE — LETTER
"5/10/2018    RE: Myrtle Aguilera  [address] [address]     Dear Colleague,    Thank you for referring your patient, Myrtle Aguilera, to the [facility] at [facility]. Please see a copy of my visit note below.    Chief Complaint: RECHECK (Pt. states that she is here today for Left Hip Pain. She mention that she is still having some groin and thigh muscle pain. She had increase her activity about a month ago, and after that she is experiencing the same pain she had before her first surgery.HX: Left Hip Arthroscopy, labral repair. DOS: 06/08/2017.)         HPI: Myrtle Aguilera returns today in follow-up for her left hip. She is about a year now lk    Returned to work:     Medications and allergies are documented in the EMR and have been reviewed.      Current Outpatient Prescriptions:      FLUTICASONE PROPIONATE, NASAL, NA, , Disp: , Rfl:      Multiple Vitamin (MULTI VITAMIN PO), , Disp: , Rfl:     Allergies: Ibuprofen and Other  [no clinical screening - see comments]    Current Status:  Results of the patient s Hip Disability and Osteoarthritis Outcome Score (HOOS)  are as follows (0-100 scales with 100 being the theoretical best):  Pain: 67.5  Symptom: 30  ADLS: 79.41  Sports: 50  Quality of life:  37.5  (http://koos.nu/)     Physical Exam:  On physical examination the patient appears the stated age, is in no acute distress, affect is appropriate, and breathing is non-labored.    Ht 1.835 m (6' 0.25\")  Wt 79.6 kg (175 lb 6.4 oz)  BMI 23.62 kg/m2  Body mass index is 23.62 kg/(m^2).    Gait: normal  Both hips are pretty irritated today, both with hip examination and with TTP about the joint at the iliac crest, rectus tendon, and greater trochanter.   Right hip ROM is a little irritated and the anterior impingement test produces groin pain.   Gains exam table: easily   ROM:  A little irritated but more so on the left   Flexion: 105 symmetric with " soreness bilateral, more on the left   Distally, the circulatory, motor, and sensation exam is intact with 5/5 EHL, gastroc-soleus, and tibialis anterior.  Sensation to light touch is intact.  Dorsalis pedis and posterior tibialis pulses are palpable.  There are no sores on the feet, no bruising, and no lymphedema.    Assessment and Plan: bilateral hip issues, the improvement that she experienced post-operatively has abated to a significant degree and we discussed the options that are available for her hip, the anterior undercoverage that she has, and the activity goals that she has. We discussed intra-articular injection and she is not interested. We discussed physical therapy and she is doing to consider this. She is going to be traveling for much of the next three to four months. She is going to RTC to discuss further how she is doing.     Referred Self      Answers for HPI/ROS submitted by the patient on 5/10/2018   General Symptoms: No  Skin Symptoms: No  HENT Symptoms: No  EYE SYMPTOMS: No  HEART SYMPTOMS: No  LUNG SYMPTOMS: No  INTESTINAL SYMPTOMS: No  URINARY SYMPTOMS: No  GYNECOLOGIC SYMPTOMS: No  BREAST SYMPTOMS: No  SKELETAL SYMPTOMS: Yes  BLOOD SYMPTOMS: No  NERVOUS SYSTEM SYMPTOMS: No  MENTAL HEALTH SYMPTOMS: No  Back pain: No  Muscle aches: Yes  Neck pain: No  Swollen joints: No  Joint pain: Yes  Bone pain: No  Muscle cramps: No  Muscle weakness: Yes  Joint stiffness: Yes  Bone fracture: No    Again, thank you for allowing me to participate in the care of your patient.      Sincerely,    Frank Lainez MD

## 2018-08-16 ASSESSMENT — HOOS S4: HOW SEVERE IS YOUR HIP JOINT STIFFNESS AFTER FIRST WAKENING IN THE MORNING?: MODERATE

## 2018-08-16 ASSESSMENT — ACTIVITIES OF DAILY LIVING (ADL)
ADL_SUBSCALE_SCORE: 91.17
ADL_SUM: 6
ADL_MEAN: .35

## 2018-08-30 ENCOUNTER — OFFICE VISIT (OUTPATIENT)
Dept: ORTHOPEDICS | Facility: CLINIC | Age: 43
End: 2018-08-30
Payer: COMMERCIAL

## 2018-08-30 VITALS — BODY MASS INDEX: 24.11 KG/M2 | HEIGHT: 72 IN | WEIGHT: 178 LBS

## 2018-08-30 DIAGNOSIS — Z98.890 STATUS POST ARTHROSCOPY OF HIP: Primary | ICD-10-CM

## 2018-08-30 NOTE — MR AVS SNAPSHOT
"              After Visit Summary   8/30/2018    Myrtle Aguilera    MRN: [id number]           Patient Information     Date Of Birth          1975        Visit Information        Provider Department      8/30/2018 2:15 PM Frank Lainez MD [facility]        Today's Diagnoses     Status post arthroscopy of hip    -  1       Follow-ups after your visit        Additional Services     PHYSICAL THERAPY REFERRAL (Internal)       Physical Therapy Referral    Greater trochanter pain program                  Who to contact     Please call your clinic at [phone] to:    Ask questions about your health    Make or cancel appointments    Discuss your medicines    Learn about your test results    Speak to your doctor            Additional Information About Your Visit        MyChart Information     [employer] gives you secure access to your electronic health record. If you see a primary care provider, you can also send messages to your care team and make appointments. If you have questions, please call your primary care clinic.  If you do not have a primary care provider, please call [phone] and they will assist you.      [employer] is an electronic gateway that provides easy, online access to your medical records. With [employer], you can request a clinic appointment, read your test results, renew a prescription or communicate with your care team.     To access your existing account, please contact your [facility] or call [phone] for assistance.        Care EveryWhere ID     This is your Care EveryWhere ID. This could be used by other organizations to access your [facility] medical records  [id number]        Your Vitals Were     Height BMI (Body Mass Index)                1.865 m (6' 1.43\") 23.21 kg/m2           Blood Pressure from Last 3 Encounters:   06/08/17 124/78    Weight from Last 3 Encounters:   08/30/18 80.7 kg (178 lb)   05/10/18 79.6 kg (175 lb 6.4 oz) "   09/07/17 79.4 kg (175 lb 1.6 oz)              We Performed the Following     PHYSICAL THERAPY REFERRAL (Internal)        Primary Care Provider    None Specified       No primary provider on file.        Equal Access to Services     [facility] : Hadii aad ku hadheiderajesh Randle, sivalee owencarloha, era angel twilaelena, waxbaljinder pauliein hayaasherie mattsoniris elaineashleyjoseph parmar. So [facility] [phone].    ATENCIÓN: Si habla español, tiene a alexander disposición servicios gratuitos de asistencia lingüística. Llame al [phone].    We comply with applicable federal civil rights laws and [place] laws. We do not discriminate on the basis of race, color, national origin, age, disability, sex, sexual orientation, or gender identity.            Thank you!     Thank you for choosing [facility]  for your care. Our goal is always to provide you with excellent care. Hearing back from our patients is one way we can continue to improve our services. Please take a few minutes to complete the written survey that you may receive in the mail after your visit with us. Thank you!             Your Updated Medication List - Protect others around you: Learn how to safely use, store and throw away your medicines at www.disposemymeds.org.          This list is accurate as of 8/30/18 11:59 PM.  Always use your most recent med list.                   Brand Name Dispense Instructions for use Diagnosis    FLUTICASONE PROPIONATE (NASAL) NA           MULTI VITAMIN PO

## 2018-08-30 NOTE — NURSING NOTE
"Reason For Visit:   Chief Complaint   Patient presents with     Left Hip - Surgical Followup     DOS 6/8/17. Left STEFAN. Pt states she is having pain in hip that radiates down to knee.        Ht 1.865 m (6' 1.43\")  Wt 80.7 kg (178 lb)  BMI 23.21 kg/m2    Pain Assessment  Patient Currently in Pain: Yes  0-10 Pain Scale: 5  Primary Pain Location: Hip (Knee)  Pain Orientation: Left  Pain Descriptors: Sore, Constant  Aggravating Factors: Walking, Exercise    [employer] [place] ATC  "

## 2018-08-30 NOTE — LETTER
"8/30/2018       RE: Myrtle Aguilera  [address] [address]     Dear Colleague,    Thank you for referring your patient, Myrtle Aguilera, [facility] the [facility]. Please see a copy of my visit note below.    Chief Complaint: Surgical Followup of the Left Hip (DOS 6/8/17. Left STEFAN. Pt states she is having pain in hip that radiates down to knee. )       HPI: Myrtle Aguilera returns today in follow-up for her hip. She reports that her hip is symptomatic, that the level is manageable, but that she is not that happy with her level of function in terms of her fitness activity.     Medications and allergies are documented in the EMR and have been reviewed.    Current Outpatient Prescriptions:      FLUTICASONE PROPIONATE, NASAL, NA, , Disp: , Rfl:      Multiple Vitamin (MULTI VITAMIN PO), , Disp: , Rfl:   Allergies: Ibuprofen and Other  [no clinical screening - see comments]    Physical Exam:  On physical examination the patient appears the stated age, is in no acute distress, affect is appropriate, and breathing is non-labored.  Ht 1.865 m (6' 1.43\")  Wt 80.7 kg (178 lb)  BMI 23.21 kg/m2  Body mass index is 23.21 kg/(m^2).  Gait: normal   ROM: both hips a little irritated, ROM is symmetric.   Distally, the circulatory, motor, and sensation exam is intact with 5/5 EHL, gastroc-soleus, and tibialis anterior.  Sensation to light touch is intact.  Dorsalis pedis and posterior tibialis pulses are palpable.  There are no sores on the feet, no bruising, and no lymphedema.    Assessment and Plan: symptomatic hip with fitness activities. We discussed her hip at length. We discussed management of symptoms and utilization of physical therapy and a home exercise program. We discussed the utility of revision arthroscopy and that this would be pretty unpredictable. She is doing to RTC in 3 months to re-evaluate and if this has been helpful.     Referred " Selfas    Answers for HPI/ROS submitted by the patient on 8/30/2018   PHQ-2 Score: 0      Again, thank you for allowing me to participate in the care of your patient.      Sincerely,    Frank Lainez MD

## 2018-08-30 NOTE — LETTER
Date:October 15, 2018      Patient was self referred, no letter generated. Do not send.        [facility] Information

## 2018-10-12 NOTE — PROGRESS NOTES
"Chief Complaint: Surgical Followup of the Left Hip (DOS 6/8/17. Left STEFAN. Pt states she is having pain in hip that radiates down to knee. )       HPI: Myrtle Aguilera returns today in follow-up for her hip. She reports that her hip is symptomatic, that the level is manageable, but that she is not that happy with her level of function in terms of her fitness activity.     Medications and allergies are documented in the EMR and have been reviewed.    Current Outpatient Prescriptions:      FLUTICASONE PROPIONATE, NASAL, NA, , Disp: , Rfl:      Multiple Vitamin (MULTI VITAMIN PO), , Disp: , Rfl:   Allergies: Ibuprofen and Other  [no clinical screening - see comments]    Physical Exam:  On physical examination the patient appears the stated age, is in no acute distress, affect is appropriate, and breathing is non-labored.  Ht 1.865 m (6' 1.43\")  Wt 80.7 kg (178 lb)  BMI 23.21 kg/m2  Body mass index is 23.21 kg/(m^2).  Gait: normal   ROM: both hips a little irritated, ROM is symmetric.   Distally, the circulatory, motor, and sensation exam is intact with 5/5 EHL, gastroc-soleus, and tibialis anterior.  Sensation to light touch is intact.  Dorsalis pedis and posterior tibialis pulses are palpable.  There are no sores on the feet, no bruising, and no lymphedema.    Assessment and Plan: symptomatic hip with fitness activities. We discussed her hip at length. We discussed management of symptoms and utilization of physical therapy and a home exercise program. We discussed the utility of revision arthroscopy and that this would be pretty unpredictable. She is doing to RTC in 3 months to re-evaluate and if this has been helpful.     Referred Selfas    Answers for HPI/ROS submitted by the patient on 8/30/2018   PHQ-2 Score: 0    "

## 2019-11-05 ENCOUNTER — HEALTH MAINTENANCE LETTER (OUTPATIENT)
Age: 44
End: 2019-11-05

## 2020-11-22 ENCOUNTER — HEALTH MAINTENANCE LETTER (OUTPATIENT)
Age: 45
End: 2020-11-22

## 2021-07-25 ENCOUNTER — HEALTH MAINTENANCE LETTER (OUTPATIENT)
Age: 46
End: 2021-07-25

## 2021-09-19 ENCOUNTER — HEALTH MAINTENANCE LETTER (OUTPATIENT)
Age: 46
End: 2021-09-19

## 2022-01-09 ENCOUNTER — HEALTH MAINTENANCE LETTER (OUTPATIENT)
Age: 47
End: 2022-01-09

## 2022-08-21 ENCOUNTER — HEALTH MAINTENANCE LETTER (OUTPATIENT)
Age: 47
End: 2022-08-21

## 2022-11-21 ENCOUNTER — HEALTH MAINTENANCE LETTER (OUTPATIENT)
Age: 47
End: 2022-11-21

## 2023-04-16 ENCOUNTER — HEALTH MAINTENANCE LETTER (OUTPATIENT)
Age: 48
End: 2023-04-16

## 2023-09-16 ENCOUNTER — HEALTH MAINTENANCE LETTER (OUTPATIENT)
Age: 48
End: 2023-09-16

## (undated) DEVICE — DRAPE TOWEL IMPERVIOUS X2 7553

## (undated) DEVICE — Device

## (undated) DEVICE — SOL NACL 0.9% IRRIG 3000ML BAG 2B7477

## (undated) DEVICE — SU VICRYL 2-0 CT-2 27" UND J269H

## (undated) DEVICE — SOL WATER IRRIG 1000ML BOTTLE 2F7114

## (undated) DEVICE — GLOVE PROTEXIS POWDER FREE 8.0 ORTHOPEDIC 2D73ET80

## (undated) DEVICE — PACK HIP CUSTOM ASC

## (undated) DEVICE — PREP DURAPREP 26ML APL 8630

## (undated) DEVICE — NDL BLUNT 18GA 1" W/O FILTER 305181

## (undated) DEVICE — PAD FOAM POST PERINEAL FX TABLE NO-001

## (undated) DEVICE — PAD ARMBOARD FOAM EGGCRATE COVIDEN 3114367

## (undated) DEVICE — SYR 30ML LL W/O NDL 302832

## (undated) DEVICE — SUCTION MANIFOLD NEPTUNE 2 SYS 4 PORT 0702-020-000

## (undated) DEVICE — DRAPE C-ARM W/STRAPS 42X72" 07-CA104

## (undated) DEVICE — CAST PADDING 6" UNSTERILE 9046

## (undated) DEVICE — ESU VULCAN PROBE EFLEX ABLATOR 72200683

## (undated) DEVICE — DRAPE IOBAN ISOLATION VERTICAL 320X21CM 6617

## (undated) DEVICE — SYR 10ML FINGER CONTROL W/O NDL 309695

## (undated) DEVICE — SU ETHILON 3-0 PS-1 18" 1663G

## (undated) DEVICE — TUBING SYSTEM ARTHREX PATIENT REDEUCE AR-6421

## (undated) DEVICE — GLOVE PROTEXIS POWDER FREE SMT 7.5  2D72PT75X

## (undated) DEVICE — BLADE ARTHRO BEAVER BANANA 376984

## (undated) DEVICE — GOWN IMPERVIOUS SPECIALTY XLG/XLONG 32474

## (undated) DEVICE — STRAP KNEE/BODY 31143004

## (undated) DEVICE — LINEN ORTHO PACK 5446

## (undated) DEVICE — BLANKET BAIR HUGGER UPPER BODY 42268

## (undated) DEVICE — TUBING SYSTEM ARTHREX PUMP REDEUCE AR-6411

## (undated) DEVICE — ESU GROUND PAD ADULT W/CORD E7507

## (undated) DEVICE — TUBING SUCTION MEDI-VAC 1/4"X20' N620A

## (undated) DEVICE — DRAPE MAYO STAND 23X54 8337

## (undated) RX ORDER — EPINEPHRINE NASAL SOLUTION 1 MG/ML
SOLUTION NASAL
Status: DISPENSED
Start: 2017-06-08

## (undated) RX ORDER — ONDANSETRON 2 MG/ML
INJECTION INTRAMUSCULAR; INTRAVENOUS
Status: DISPENSED
Start: 2017-06-08

## (undated) RX ORDER — PROPOFOL 10 MG/ML
INJECTION, EMULSION INTRAVENOUS
Status: DISPENSED
Start: 2017-06-08

## (undated) RX ORDER — FENTANYL CITRATE 50 UG/ML
INJECTION, SOLUTION INTRAMUSCULAR; INTRAVENOUS
Status: DISPENSED
Start: 2017-06-08

## (undated) RX ORDER — OXYCODONE HYDROCHLORIDE 5 MG/1
TABLET ORAL
Status: DISPENSED
Start: 2017-06-08

## (undated) RX ORDER — DEXAMETHASONE SODIUM PHOSPHATE 4 MG/ML
INJECTION, SOLUTION INTRA-ARTICULAR; INTRALESIONAL; INTRAMUSCULAR; INTRAVENOUS; SOFT TISSUE
Status: DISPENSED
Start: 2017-06-08

## (undated) RX ORDER — MORPHINE SULFATE 0.5 MG/ML
INJECTION, SOLUTION EPIDURAL; INTRATHECAL; INTRAVENOUS
Status: DISPENSED
Start: 2017-06-08

## (undated) RX ORDER — ACETAMINOPHEN 325 MG/1
TABLET ORAL
Status: DISPENSED
Start: 2017-06-08

## (undated) RX ORDER — GABAPENTIN 300 MG/1
CAPSULE ORAL
Status: DISPENSED
Start: 2017-06-08